# Patient Record
Sex: FEMALE | Race: WHITE | Employment: PART TIME | URBAN - METROPOLITAN AREA
[De-identification: names, ages, dates, MRNs, and addresses within clinical notes are randomized per-mention and may not be internally consistent; named-entity substitution may affect disease eponyms.]

---

## 2018-08-01 ENCOUNTER — OFFICE VISIT (OUTPATIENT)
Dept: VASCULAR SURGERY | Age: 58
End: 2018-08-01
Payer: COMMERCIAL

## 2018-08-01 VITALS — SYSTOLIC BLOOD PRESSURE: 112 MMHG | DIASTOLIC BLOOD PRESSURE: 82 MMHG | HEART RATE: 72 BPM

## 2018-08-01 DIAGNOSIS — I73.9 PERIPHERAL VASCULAR DISEASE (HCC): Chronic | ICD-10-CM

## 2018-08-01 PROCEDURE — 1036F TOBACCO NON-USER: CPT | Performed by: SURGERY

## 2018-08-01 PROCEDURE — 99213 OFFICE O/P EST LOW 20 MIN: CPT | Performed by: SURGERY

## 2018-08-01 PROCEDURE — G8427 DOCREV CUR MEDS BY ELIG CLIN: HCPCS | Performed by: SURGERY

## 2018-08-01 PROCEDURE — G8421 BMI NOT CALCULATED: HCPCS | Performed by: SURGERY

## 2018-08-01 PROCEDURE — 3017F COLORECTAL CA SCREEN DOC REV: CPT | Performed by: SURGERY

## 2018-08-01 RX ORDER — ERGOCALCIFEROL 1.25 MG/1
50000 CAPSULE ORAL WEEKLY
Refills: 0 | COMMUNITY
Start: 2018-06-16

## 2018-08-01 RX ORDER — CONJUGATED ESTROGENS 0.62 MG/G
CREAM VAGINAL
Refills: 0 | COMMUNITY
Start: 2018-07-13

## 2018-08-01 RX ORDER — DIPHENHYDRAMINE HCL 25 MG/1
TABLET, CHEWABLE ORAL
Refills: 0 | COMMUNITY
Start: 2018-05-03 | End: 2019-11-13

## 2018-08-20 ENCOUNTER — HOSPITAL ENCOUNTER (OUTPATIENT)
Dept: CARDIOLOGY | Age: 58
Discharge: HOME | End: 2018-08-20
Payer: COMMERCIAL

## 2018-08-20 PROCEDURE — 93970 EXTREMITY STUDY: CPT

## 2018-08-23 ENCOUNTER — TELEPHONE (OUTPATIENT)
Dept: VASCULAR SURGERY | Age: 58
End: 2018-08-23

## 2018-09-12 ENCOUNTER — OFFICE VISIT (OUTPATIENT)
Dept: VASCULAR SURGERY | Age: 58
End: 2018-09-12
Payer: COMMERCIAL

## 2018-09-12 VITALS — SYSTOLIC BLOOD PRESSURE: 118 MMHG | HEART RATE: 76 BPM | DIASTOLIC BLOOD PRESSURE: 76 MMHG

## 2018-09-12 DIAGNOSIS — I83.93 VARICOSE VEINS OF BOTH LOWER EXTREMITIES: Primary | ICD-10-CM

## 2018-09-12 PROCEDURE — 99213 OFFICE O/P EST LOW 20 MIN: CPT | Performed by: SURGERY

## 2018-09-12 PROCEDURE — G8427 DOCREV CUR MEDS BY ELIG CLIN: HCPCS | Performed by: SURGERY

## 2018-09-12 PROCEDURE — 1036F TOBACCO NON-USER: CPT | Performed by: SURGERY

## 2018-09-12 PROCEDURE — G8421 BMI NOT CALCULATED: HCPCS | Performed by: SURGERY

## 2018-09-12 PROCEDURE — 3017F COLORECTAL CA SCREEN DOC REV: CPT | Performed by: SURGERY

## 2018-09-12 RX ORDER — ASPIRIN 81 MG/1
81 TABLET ORAL DAILY
COMMUNITY
End: 2019-11-13

## 2018-09-12 NOTE — PROGRESS NOTES
facial asymmetry, speech difficulty, weakness, light-headedness, numbness and headaches. Hematological: Negative for adenopathy. Does not bruise/bleed easily. Psychiatric/Behavioral: Negative for agitation, confusion, sleep disturbance and suicidal ideas. The patient is not nervous/anxious. PHYSICAL EXAM:  Vitals:    09/12/18 0923   BP: 118/76   Pulse: 76     General Appearance: alert and oriented to person, place and time, well developed and well- nourished, in no acute distress  Skin: warm and dry, no rash or erythema  Head: normocephalic and atraumatic  Eyes: extraocular eye movements intact, conjunctivae normal  ENT: external ear and ear canal normal bilaterally, nose without deformity  Pulmonary/Chest: clear to auscultation bilaterally- no wheezes, rales or rhonchi, normal air movement, no respiratory distress  Cardiovascular: normal rate, regular rhythm, normal S1 and S2, no murmurs, no carotid bruits  Abdomen: soft, non-tender, non-distended, normal bowel sounds, no masses or organomegaly  Musculoskeletal: normal range of motion, no joint swelling, deformity or tenderness  Neurologic: no cranial nerve deficit, gait, coordination and speech normal  Extremities: no leg edema bilaterally, she has bilateral varicose veins are present. She has mostly spider veins in the corona radiata appearance of the bilateral thighs. She has some areas that are very superficial to the skin in these are the areas that have bled in the past.  She is easily bilateral palpable dorsalis pedis and posterior tibial pulses in bilateral brachial radial pulses all of which are 2-3+ and symmetric.         Chief Complaint   Patient presents with    Circulatory Problem     Discuss test results.          Problem List Items Addressed This Visit     None      Visit Diagnoses     Varicose veins of both lower extremities    -  Primary    Relevant Medications    aspirin EC 81 MG EC tablet          #1 Bilateral lower extremity

## 2018-09-25 ENCOUNTER — TELEPHONE (OUTPATIENT)
Dept: VASCULAR SURGERY | Age: 58
End: 2018-09-25

## 2018-09-25 NOTE — TELEPHONE ENCOUNTER
Left message that insurance company denied sclerotherapy, they stated that they found the service is not a covered benefit.

## 2018-10-18 ENCOUNTER — HOSPITAL ENCOUNTER (OUTPATIENT)
Age: 58
Discharge: HOME | End: 2018-10-20
Payer: COMMERCIAL

## 2018-10-18 PROCEDURE — 88175 CYTOPATH C/V AUTO FLUID REDO: CPT

## 2018-12-10 ENCOUNTER — HOSPITAL ENCOUNTER (OUTPATIENT)
Dept: HOSPITAL 83 - SDC | Age: 58
Discharge: HOME | End: 2018-12-10
Payer: COMMERCIAL

## 2018-12-10 VITALS — DIASTOLIC BLOOD PRESSURE: 83 MMHG

## 2018-12-10 VITALS — DIASTOLIC BLOOD PRESSURE: 84 MMHG | SYSTOLIC BLOOD PRESSURE: 132 MMHG

## 2018-12-10 VITALS — BODY MASS INDEX: 31.89 KG/M2 | HEIGHT: 62.99 IN | WEIGHT: 180 LBS

## 2018-12-10 VITALS — DIASTOLIC BLOOD PRESSURE: 79 MMHG | SYSTOLIC BLOOD PRESSURE: 127 MMHG

## 2018-12-10 VITALS — DIASTOLIC BLOOD PRESSURE: 81 MMHG

## 2018-12-10 DIAGNOSIS — K29.50: Primary | ICD-10-CM

## 2018-12-10 DIAGNOSIS — Z98.890: ICD-10-CM

## 2018-12-10 DIAGNOSIS — Z88.8: ICD-10-CM

## 2018-12-10 DIAGNOSIS — Z88.2: ICD-10-CM

## 2018-12-10 DIAGNOSIS — Z90.710: ICD-10-CM

## 2018-12-10 DIAGNOSIS — Z87.891: ICD-10-CM

## 2019-02-24 PROBLEM — I73.9 PERIPHERAL VASCULAR DISEASE (HCC): Chronic | Status: ACTIVE | Noted: 2019-02-24

## 2019-11-13 PROBLEM — R06.02 SOB (SHORTNESS OF BREATH): Status: ACTIVE | Noted: 2019-09-28

## 2019-11-13 PROBLEM — E78.00 PURE HYPERCHOLESTEROLEMIA: Status: ACTIVE | Noted: 2019-09-28

## 2019-11-13 PROBLEM — Z91.89 CARDIOVASCULAR RISK FACTOR: Status: ACTIVE | Noted: 2019-09-28

## 2019-11-13 PROBLEM — R73.03 PREDIABETES: Status: ACTIVE | Noted: 2019-09-28

## 2019-11-13 PROBLEM — Z78.9 STATIN INTOLERANCE: Status: ACTIVE | Noted: 2019-09-28

## 2019-11-13 PROBLEM — G47.33 OBSTRUCTIVE SLEEP APNEA: Status: ACTIVE | Noted: 2019-09-28

## 2019-11-13 PROBLEM — E04.1 NONTOXIC SINGLE THYROID NODULE: Status: ACTIVE | Noted: 2017-11-08

## 2020-01-23 ENCOUNTER — TELEPHONE (OUTPATIENT)
Dept: VASCULAR SURGERY | Age: 60
End: 2020-01-23

## 2020-02-28 ENCOUNTER — HOSPITAL ENCOUNTER (OUTPATIENT)
Dept: CARDIOLOGY | Age: 60
Discharge: HOME | End: 2020-02-28
Payer: COMMERCIAL

## 2020-02-28 PROCEDURE — 93978 VASCULAR STUDY: CPT

## 2020-03-05 ENCOUNTER — TELEPHONE (OUTPATIENT)
Dept: VASCULAR SURGERY | Age: 60
End: 2020-03-05

## 2020-09-15 ENCOUNTER — TELEPHONE (OUTPATIENT)
Dept: VASCULAR SURGERY | Age: 60
End: 2020-09-15

## 2020-10-20 ENCOUNTER — TELEPHONE (OUTPATIENT)
Dept: VASCULAR SURGERY | Age: 60
End: 2020-10-20

## 2020-10-21 ENCOUNTER — OFFICE VISIT (OUTPATIENT)
Dept: VASCULAR SURGERY | Age: 60
End: 2020-10-21
Payer: COMMERCIAL

## 2020-10-21 VITALS — HEIGHT: 63 IN | BODY MASS INDEX: 31.89 KG/M2 | WEIGHT: 180 LBS

## 2020-10-21 PROBLEM — M79.605 PAIN IN BOTH LOWER EXTREMITIES: Chronic | Status: ACTIVE | Noted: 2020-10-21

## 2020-10-21 PROBLEM — M79.604 PAIN IN BOTH LOWER EXTREMITIES: Chronic | Status: ACTIVE | Noted: 2020-10-21

## 2020-10-21 PROCEDURE — 1036F TOBACCO NON-USER: CPT | Performed by: SURGERY

## 2020-10-21 PROCEDURE — G8427 DOCREV CUR MEDS BY ELIG CLIN: HCPCS | Performed by: SURGERY

## 2020-10-21 PROCEDURE — G8417 CALC BMI ABV UP PARAM F/U: HCPCS | Performed by: SURGERY

## 2020-10-21 PROCEDURE — G8484 FLU IMMUNIZE NO ADMIN: HCPCS | Performed by: SURGERY

## 2020-10-21 PROCEDURE — 3017F COLORECTAL CA SCREEN DOC REV: CPT | Performed by: SURGERY

## 2020-10-21 PROCEDURE — 99213 OFFICE O/P EST LOW 20 MIN: CPT | Performed by: SURGERY

## 2020-10-21 NOTE — PROGRESS NOTES
Vascular Surgery Outpatient Progress Note      Chief Complaint   Patient presents with    Circulatory Problem     PVD       HISTORY OF PRESENT ILLNESS:                The patient is a 64 y.o. female who returns for follow-up evaluation of leg pain and discomfort. She describes her leg pain and discomfort associated with some varicose veins. She has classic corona radiata of the bilateral thighs. She describes some intermittent swelling of her ankles as well as her knees. She is recently had a right knee drained. She is concerned about blood clots. She denies any chest pain shortness of breath or discomfort. She denies any discoloration of her lower extremities. She denies any history of prior blood clots or bleeding disorders. She wants to undergo dry needling of her right leg followed by a custom knee brace. And would like our opinion    Past Medical History:        Diagnosis Date    Seasonal allergies     Sleep apnea     Vitamin D deficiency      Past Surgical History:        Procedure Laterality Date    HERNIA REPAIR Bilateral     inguinal    HYSTERECTOMY, VAGINAL      SINUS SURGERY      VAGINAL PROLAPSE REPAIR      [id number]     Current Medications:   Prior to Admission medications    Medication Sig Start Date End Date Taking?  Authorizing Provider   mometasone (ASMANEX, 30 METERED DOSES,) 110 MCG/INH AEPB Inhale 2 puffs into the lungs 2 times daily 5/29/20 6/28/20  Lopez Culp MD   albuterol sulfate  (90 Base) MCG/ACT inhaler INHALE 2 PUFFS BY MOUTH INTO THE LUNGS EVERY 6 HOURS AS NEEDED FOR WHEEZING 5/7/20   Lopez Culp MD   azelastine (ASTELIN) 0.1 % nasal spray instill 1 spray into each nostril twice a day  Patient not taking: Reported on 7/31/2020 4/6/20   Lopez Culp MD   pravastatin (PRAVACHOL) 10 MG tablet Take 10 mg by mouth daily 11/4/19   Historical Provider, MD   vitamin D (ERGOCALCIFEROL) 73191 units CAPS capsule Take 50,000 Units by mouth once a week 6/16/18 Historical Provider, MD   PREMARIN 0.625 MG/GM vaginal cream  18   Historical Provider, MD   sodium chloride (OCEAN, BABY AYR) 0.65 % nasal spray 1 spray by Nasal route as needed for Congestion    Historical Provider, MD     Allergies:  Erythromycin; Meperidine hcl; Sulfa antibiotics;  Adhesive tape; Demerol hcl [meperidine]; and Dermabond    Social History     Socioeconomic History    Marital status:      Spouse name: Not on file    Number of children: Not on file    Years of education: Not on file    Highest education level: Not on file   Occupational History    Not on file   Social Needs    Financial resource strain: Not on file    Food insecurity     Worry: Not on file     Inability: Not on file    Transportation needs     Medical: Not on file     Non-medical: Not on file   Tobacco Use    Smoking status: Former Smoker     Packs/day: 0.25     Years: 5.00     Pack years: 1.25     Types: Cigarettes     Start date:      Last attempt to quit: 1988     Years since quittin.2    Smokeless tobacco: Never Used   Substance and Sexual Activity    Alcohol use: Yes     Comment: once per month    Drug use: Never    Sexual activity: Not Currently     Partners: Male   Lifestyle    Physical activity     Days per week: Not on file     Minutes per session: Not on file    Stress: Not on file   Relationships    Social connections     Talks on phone: Not on file     Gets together: Not on file     Attends [employer] service: Not on file     Active member of club or organization: Not on file     Attends meetings of clubs or organizations: Not on file     Relationship status: Not on file    Intimate partner violence     Fear of current or ex partner: Not on file     Emotionally abused: Not on file     Physically abused: Not on file     Forced sexual activity: Not on file   Other Topics Concern    Not on file   Social History Narrative    Not on file        Family History   Problem Relation Age of Onset    Diabetes Mother     Sleep Apnea Mother     High Blood Pressure Mother     COPD Father     High Blood Pressure Father        REVIEW OF SYSTEMS:    Constitutional: Negative for activity change, appetite change, chills, diaphoresis, fatigue, fever and unexpected weight change. HEENT: Negative for congestion, ear discharge, ear pain, hearing loss, nosebleeds, rhinorrhea, sinus pain, sore throat, tinnitus, trouble swallowing and voice change. Eyes: Negative for photophobia, pain, discharge, redness, itching and visual disturbance. Respiratory: Negative for apnea, cough, chest tightness, shortness of breath and wheezing. Cardiovascular: Negative for chest pain, palpitations and leg swelling. Gastrointestinal: Negative for abdominal distention, abdominal pain, blood in stool, constipation, diarrhea, nausea and vomiting. Endocrine: Negative for cold intolerance, heat intolerance, polydipsia, polyphagia and polyuria. Genitourinary: Negative for decreased urine volume, difficulty urinating, dysuria, frequency, hematuria and urgency. Musculoskeletal: Knee pain and knee swelling negative for arthralgias, back pain, gait problem, joint swelling and neck pain. Skin: Negative for color change, pallor, rash and wound. Allergic/Immunologic: Negative for environmental allergies, food allergies and immunocompromised state. Neurological: Negative for dizziness, syncope, facial asymmetry, speech difficulty, weakness, light-headedness, numbness and headaches. Hematological: Negative for adenopathy. Does not bruise/bleed easily. Psychiatric/Behavioral: Negative for agitation, confusion, sleep disturbance and suicidal ideas. The patient is not nervous/anxious. Vascular see history of present illness      PHYSICAL EXAM:  There were no vitals filed for this visit.   General Appearance: alert and oriented to person, place and time, well developed and well- nourished, in no acute distress  Skin: warm and dry, no rash or erythema  Head: normocephalic and atraumatic  Eyes: extraocular eye movements intact, conjunctivae normal  ENT: external ear and ear canal normal bilaterally, nose without deformity  Pulmonary/Chest: clear to auscultation bilaterally- no wheezes, rales or rhonchi, normal air movement, no respiratory distress  Cardiovascular: normal rate, regular rhythm, normal S1 and S2, no murmurs, no carotid bruits  Abdomen: soft, non-tender, non-distended, normal bowel sounds, no masses or organomegaly  Musculoskeletal: normal range of motion, no joint swelling, deformity or tenderness  Neurologic: no cranial nerve deficit, gait, coordination and speech normal  Extremities: Bilateral palpable brachial and radial pulses metric at 3+. Bilateral palpable dorsalis pedis and posterior tibials are 3+. She has classic bilateral corona radiata spider veins of the bilateral thighs. There is no swelling. There is no edema. Problem List Items Addressed This Visit     Pain in both lower extremities (Chronic)          #1 lower extremity leg pain and discomfort combined with lower extremity swelling. She is concerned for DVTs. I reviewed her prior study. She has no evidence of DVT. She had no evidence of reflux disease we will reorder the ultrasound of her bilateral lower extremity secondary to her intermittent swelling that she has had. She can call for the results. As far as I am concerned she can continue with her knee therapy. She will call if there is any questions or concerns or any issues. #2 her father had a history of aneurysmal disease. She has no evidence of aneurysmal disease. I reviewed her studies. No follow-ups on file.

## 2020-10-30 ENCOUNTER — HOSPITAL ENCOUNTER (OUTPATIENT)
Dept: ULTRASOUND IMAGING | Age: 60
Discharge: HOME | End: 2020-11-01
Payer: COMMERCIAL

## 2020-10-30 PROCEDURE — 93970 EXTREMITY STUDY: CPT | Performed by: RADIOLOGY

## 2020-10-30 PROCEDURE — 93970 EXTREMITY STUDY: CPT

## 2020-11-02 ENCOUNTER — TELEPHONE (OUTPATIENT)
Dept: VASCULAR SURGERY | Age: 60
End: 2020-11-02

## 2020-11-02 NOTE — TELEPHONE ENCOUNTER
Notified patient that the venous ultrasound was normal, no evidence of deep vein thrombosis. Sent Dr. Anish Amado copy of results, per patient's request.  Sent appt card for 1 year follow up with Dr. Jhonny Isaac.

## 2021-07-02 ENCOUNTER — HOSPITAL ENCOUNTER (OUTPATIENT)
Dept: GENERAL RADIOLOGY | Age: 61
Discharge: HOME | End: 2021-07-04
Payer: COMMERCIAL

## 2021-07-02 ENCOUNTER — HOSPITAL ENCOUNTER (OUTPATIENT)
Age: 61
Discharge: HOME | End: 2021-07-04
Payer: COMMERCIAL

## 2021-07-02 DIAGNOSIS — R06.02 SOB (SHORTNESS OF BREATH): ICD-10-CM

## 2021-07-02 PROCEDURE — 71046 X-RAY EXAM CHEST 2 VIEWS: CPT

## 2021-09-23 ENCOUNTER — HOSPITAL ENCOUNTER (OUTPATIENT)
Dept: GENERAL RADIOLOGY | Age: 61
Discharge: HOME | End: 2021-09-25
Payer: COMMERCIAL

## 2021-09-23 ENCOUNTER — HOSPITAL ENCOUNTER (OUTPATIENT)
Age: 61
Discharge: HOME | End: 2021-09-25
Payer: COMMERCIAL

## 2021-09-23 DIAGNOSIS — R06.02 SOB (SHORTNESS OF BREATH): ICD-10-CM

## 2021-09-23 PROCEDURE — 71046 X-RAY EXAM CHEST 2 VIEWS: CPT

## 2021-11-02 ENCOUNTER — TELEPHONE (OUTPATIENT)
Dept: VASCULAR SURGERY | Age: 61
End: 2021-11-02

## 2021-11-02 NOTE — TELEPHONE ENCOUNTER
Called to confirm appointment for 11/3/21 at 1030 a.m, left message with date, time, and phone number for patient.

## 2021-11-03 ENCOUNTER — TELEPHONE (OUTPATIENT)
Dept: VASCULAR SURGERY | Age: 61
End: 2021-11-03

## 2021-11-03 ENCOUNTER — OFFICE VISIT (OUTPATIENT)
Dept: VASCULAR SURGERY | Age: 61
End: 2021-11-03
Payer: COMMERCIAL

## 2021-11-03 VITALS
DIASTOLIC BLOOD PRESSURE: 70 MMHG | HEIGHT: 63 IN | BODY MASS INDEX: 31.89 KG/M2 | SYSTOLIC BLOOD PRESSURE: 110 MMHG | WEIGHT: 180 LBS

## 2021-11-03 DIAGNOSIS — I83.811 VARICOSE VEINS OF RIGHT LOWER EXTREMITY WITH PAIN: ICD-10-CM

## 2021-11-03 DIAGNOSIS — Z13.6 ENCOUNTER FOR SCREENING FOR STENOSIS OF CAROTID ARTERY: ICD-10-CM

## 2021-11-03 DIAGNOSIS — M79.89 LEG SWELLING: Primary | ICD-10-CM

## 2021-11-03 PROCEDURE — 99214 OFFICE O/P EST MOD 30 MIN: CPT | Performed by: PHYSICIAN ASSISTANT

## 2021-11-03 PROCEDURE — G8417 CALC BMI ABV UP PARAM F/U: HCPCS | Performed by: PHYSICIAN ASSISTANT

## 2021-11-03 PROCEDURE — 3017F COLORECTAL CA SCREEN DOC REV: CPT | Performed by: PHYSICIAN ASSISTANT

## 2021-11-03 PROCEDURE — 1036F TOBACCO NON-USER: CPT | Performed by: PHYSICIAN ASSISTANT

## 2021-11-03 PROCEDURE — G8484 FLU IMMUNIZE NO ADMIN: HCPCS | Performed by: PHYSICIAN ASSISTANT

## 2021-11-03 PROCEDURE — G8427 DOCREV CUR MEDS BY ELIG CLIN: HCPCS | Performed by: PHYSICIAN ASSISTANT

## 2021-11-03 NOTE — PROGRESS NOTES
Vascular Surgery Outpatient Progress Note      Chief Complaint   Patient presents with    Check-Up     varicose veins       HISTORY OF PRESENT ILLNESS:                The patient is a 63 y.o. female who returns for follow-up evaluation of right leg pain and discomfort. She describes her leg pain and discomfort associated with some varicose veins to the medial right knee. She describes it to be achy and throbbing, worse at the end of the day. She has classic corona radiata of the bilateral thighs. She denies any leg swelling currently. She wears thigh high compression stockings intermittently. She has no history of dvt. She has no family history of dvt. She has chronic history of right knee pain. She has no pain of the LLE. Pt's father has history of AAA and carotid artery stenosis. Her aunt and uncle both has strokes associated with their carotid stenosis, per her report. She is a nonsmoker but her  smokes and she is exposed second hand. Past Medical History:        Diagnosis Date    Seasonal allergies     Sleep apnea     Vitamin D deficiency      Past Surgical History:        Procedure Laterality Date    HERNIA REPAIR Bilateral     inguinal    HYSTERECTOMY, VAGINAL      SINUS SURGERY      VAGINAL PROLAPSE REPAIR      [phone]     Current Medications:   Prior to Admission medications    Medication Sig Start Date End Date Taking?  Authorizing Provider   [facility]  MCG/ACT AERO inhaler inhale 2 puffs by mouth and INTO THE LUNGS twice a day 8/16/21  Yes Avery Garrido MD   lisinopril (PRINIVIL;ZESTRIL) 10 MG tablet Take 10 mg by mouth daily   Yes Historical Provider, MD   montelukast (SINGULAIR) 10 MG tablet Take 10 mg by mouth nightly 6/27/21  Yes Historical Provider, MD   albuterol sulfate  (90 Base) MCG/ACT inhaler INHALE 2 PUFFS BY MOUTH INTO THE LUNGS EVERY 6 HOURS AS NEEDED FOR WHEEZING 12/2/20  Yes Avery Garrido MD   olopatadine (PATANASE) 0.6 % SOLN nassl soln 0.6 Act by Social Connections:     Frequency of Communication with Friends and Family:     Frequency of Social Gatherings with Friends and Family:     Attends Sikhism Services:     Active Member of Clubs or Organizations:     Attends Club or Organization Meetings:     Marital Status:    Intimate Partner Violence:     Fear of Current or Ex-Partner:     Emotionally Abused:     Physically Abused:     Sexually Abused:         Family History   Problem Relation Age of Onset    Diabetes Mother     Sleep Apnea Mother     High Blood Pressure Mother     COPD Father     High Blood Pressure Father        PHYSICAL EXAM:  Vitals:    11/03/21 1037   BP: 110/70     General Appearance: alert and oriented to person, place and time, well developed and well- nourished, in no acute distress  Skin: warm and dry, no rash or erythema  Head: normocephalic and atraumatic  Eyes: extraocular eye movements intact, conjunctivae normal  ENT: external ear and ear canal normal bilaterally, nose without deformity  Pulmonary/Chest: clear to auscultation bilaterally- no wheezes, rales or rhonchi, normal air movement, no respiratory distress  Cardiovascular: normal rate, regular rhythm, normal S1 and S2, no murmurs, no carotid bruits  Abdomen: soft, non-tender, non-distended, normal bowel sounds, no masses or organomegaly  Musculoskeletal: normal range of motion, no joint swelling, deformity or tenderness  Neurologic: no cranial nerve deficit, gait, coordination and speech normal  Extremities: Bilateral palpable brachial and radial pulses metric at 3+. Bilateral palpable dorsalis pedis and posterior tibials are 2+. She has classic bilateral corona radiata spider veins of the bilateral thighs. She has a couple of varicosities in bilateral feet. There is no edema.     Problem List Items Addressed This Visit        Vascular Problems    Varicose veins of right lower extremity with pain    Relevant Orders    US LOWER EXTREMITY RIGHT VEIN MAPPING W DVT       Other    Leg swelling - Primary    Relevant Orders    US LOWER EXTREMITY RIGHT VEIN MAPPING W DVT      Other Visit Diagnoses     Encounter for screening for stenosis of carotid artery        Relevant Orders    US CAROTID ARTERY BILATERAL        #1 RLE varicosities with pain: I reviewed with the patient that the best therapy is to continue her thigh high compression stockings and elevate the extremities. I will obtain a reflux study as she has new symptoms of pain and edema. I discussed that surgical intervention will not improve the appearance of her telangiectasias and that it will only be performed with there is significant reflux and symptoms. She will call for results and follow up will be arranged at that time if warranted. #2 family history of carotid artery stenosis with tobacco exposure: will obtain screening carotid US. I discussed s/s of stroke and instruction should they occur. She will remain on risk reduction therapy. She will call for results of her US and we will arrange fu at that time.     Eron Munroe PA-C

## 2021-11-03 NOTE — TELEPHONE ENCOUNTER
Left message regarding testing at [address]. [facility] on Monday, 11-29-21 at 1:00 pm. [place] at 12:30 pm.

## 2021-11-29 ENCOUNTER — HOSPITAL ENCOUNTER (OUTPATIENT)
Dept: ULTRASOUND IMAGING | Age: 61
Discharge: HOME | End: 2021-12-01
Payer: COMMERCIAL

## 2021-11-29 DIAGNOSIS — I83.811 VARICOSE VEINS OF RIGHT LOWER EXTREMITY WITH PAIN: ICD-10-CM

## 2021-11-29 DIAGNOSIS — Z13.6 ENCOUNTER FOR SCREENING FOR STENOSIS OF CAROTID ARTERY: ICD-10-CM

## 2021-11-29 DIAGNOSIS — M79.89 LEG SWELLING: ICD-10-CM

## 2021-11-29 PROCEDURE — 93880 EXTRACRANIAL BILAT STUDY: CPT | Performed by: RADIOLOGY

## 2021-11-29 PROCEDURE — 93971 EXTREMITY STUDY: CPT | Performed by: RADIOLOGY

## 2021-11-29 PROCEDURE — 93971 EXTREMITY STUDY: CPT

## 2021-11-29 PROCEDURE — 93880 EXTRACRANIAL BILAT STUDY: CPT

## 2021-12-02 ENCOUNTER — TELEPHONE (OUTPATIENT)
Dept: VASCULAR SURGERY | Age: 61
End: 2021-12-02

## 2021-12-02 DIAGNOSIS — J45.20 MILD INTERMITTENT ASTHMA WITHOUT COMPLICATION: ICD-10-CM

## 2021-12-02 DIAGNOSIS — Z91.09 ENVIRONMENTAL ALLERGIES: ICD-10-CM

## 2021-12-02 DIAGNOSIS — R06.02 SOB (SHORTNESS OF BREATH): ICD-10-CM

## 2021-12-02 LAB — EOSINOPHILS ABSOLUTE COUNT: 110 /UL (ref 50–250)

## 2021-12-03 NOTE — TELEPHONE ENCOUNTER
Notified patient of test results. Scheduled follow up appointment to discuss options for varicose veins on 12-29-21.

## 2021-12-03 NOTE — TELEPHONE ENCOUNTER
Tell her the carotids are good.   No DVT she has some reflux and will we see her in the office after the holidays

## 2021-12-12 LAB
Lab: NORMAL
REPORT: NORMAL
THIS TEST SENT TO: NORMAL

## 2021-12-28 ENCOUNTER — TELEPHONE (OUTPATIENT)
Dept: VASCULAR SURGERY | Age: 61
End: 2021-12-28

## 2021-12-29 ENCOUNTER — OFFICE VISIT (OUTPATIENT)
Dept: VASCULAR SURGERY | Age: 61
End: 2021-12-29
Payer: COMMERCIAL

## 2021-12-29 VITALS
DIASTOLIC BLOOD PRESSURE: 80 MMHG | HEIGHT: 63 IN | WEIGHT: 177 LBS | SYSTOLIC BLOOD PRESSURE: 120 MMHG | BODY MASS INDEX: 31.36 KG/M2

## 2021-12-29 DIAGNOSIS — I78.1 SPIDER VEINS: Chronic | ICD-10-CM

## 2021-12-29 PROCEDURE — G8417 CALC BMI ABV UP PARAM F/U: HCPCS | Performed by: SURGERY

## 2021-12-29 PROCEDURE — 1036F TOBACCO NON-USER: CPT | Performed by: SURGERY

## 2021-12-29 PROCEDURE — G8482 FLU IMMUNIZE ORDER/ADMIN: HCPCS | Performed by: SURGERY

## 2021-12-29 PROCEDURE — 99213 OFFICE O/P EST LOW 20 MIN: CPT | Performed by: SURGERY

## 2021-12-29 PROCEDURE — G8427 DOCREV CUR MEDS BY ELIG CLIN: HCPCS | Performed by: SURGERY

## 2021-12-29 PROCEDURE — 3017F COLORECTAL CA SCREEN DOC REV: CPT | Performed by: SURGERY

## 2021-12-29 NOTE — PROGRESS NOTES
Vascular Surgery Outpatient Progress Note      Chief Complaint   Patient presents with    Follow-up     varicose veins       HISTORY OF PRESENT ILLNESS:                The patient is a 63 y.o. female who returns for follow-up evaluation of right leg pain and discomfort. She describes her leg pain and discomfort associated with some varicose veins to the medial right knee. She describes it to be achy and throbbing, worse at the end of the day. She has classic corona radiata of the bilateral thighs. She denies any leg swelling currently. She wears thigh high compression stockings intermittently. She has no history of dvt. She has no family history of dvt. She has chronic history of right knee pain. She has no pain of the LLE. She otherwise is doing well she still has some pain and discomfort of her left thigh. She is worried when she shaves she is going to cut the varicose veins. She has not had any issue yet. But it causes her some pain and discomfort on a scale 1-10 approximately 5 and achy in nature. Worse at the end of the day. She has compression stockings and has been intermittently compliant. Past Medical History:        Diagnosis Date    Seasonal allergies     Sleep apnea     Vitamin D deficiency      Past Surgical History:        Procedure Laterality Date    HERNIA REPAIR Bilateral     inguinal    HYSTERECTOMY, VAGINAL      SINUS SURGERY      VAGINAL PROLAPSE REPAIR      [id number]     Current Medications:   Prior to Admission medications    Medication Sig Start Date End Date Taking?  Authorizing Provider   albuterol sulfate  (90 Base) MCG/ACT inhaler inhale 2 puffs by mouth and INTO THE LUNGS every 6 hours if needed for wheezing 12/2/21  Yes Willian Cash,    Ascorbic Acid (VITAMIN C) 250 MG tablet Take 250 mg by mouth daily   Yes Historical Provider, MD   cycloSPORINE (RESTASIS) 0.05 % ophthalmic emulsion 1 drop 2 times daily   Yes Historical Provider, MD   [facility]  MCG/ACT AERO inhaler inhale 2 puffs by mouth and INTO THE LUNGS twice a day 21  Yes Belen Carrion MD   lisinopril (PRINIVIL;ZESTRIL) 10 MG tablet Take 10 mg by mouth daily   Yes Historical Provider, MD   montelukast (SINGULAIR) 10 MG tablet Take 10 mg by mouth nightly 21  Yes Historical Provider, MD   olopatadine (PATANASE) 0.6 % SOLN nassl soln 0.6 Act by Nasal route daily   Yes Historical Provider, MD   zinc 50 MG TABS tablet Take 50 mg by mouth daily   Yes Historical Provider, MD   pravastatin (PRAVACHOL) 10 MG tablet Take 10 mg by mouth daily 19  Yes Historical Provider, MD   vitamin D (ERGOCALCIFEROL) 32368 units CAPS capsule Take 50,000 Units by mouth once a week 18  Yes Historical Provider, MD   PREMARIN 0.625 MG/GM vaginal cream  18  Yes Historical Provider, MD   sodium chloride (OCEAN, BABY AYR) 0.65 % nasal spray 1 spray by Nasal route as needed for Congestion   Yes Historical Provider, MD     Allergies:  Erythromycin, Meperidine hcl, Sulfa antibiotics, Adhesive tape, Demerol hcl [meperidine], and Dermabond    Social History     Socioeconomic History    Marital status:      Spouse name: Not on file    Number of children: Not on file    Years of education: Not on file    Highest education level: Not on file   Occupational History    Occupation:    Tobacco Use    Smoking status: Former Smoker     Packs/day: 0.25     Years: 5.00     Pack years: 1.25     Types: Cigarettes     Start date:      Quit date: 1988     Years since quittin.4    Smokeless tobacco: Never Used   Vaping Use    Vaping Use: Never used    Passive vaping exposure: Yes   Substance and Sexual Activity    Alcohol use: Yes     Comment: once per month    Drug use: Never    Sexual activity: Not Currently     Partners: Male   Other Topics Concern    Not on file   Social History Narrative    Not on file     Social Determinants of Health     Financial Resource Strain:    [place] Difficulty of Paying Living Expenses: Not on file   Food Insecurity:     Worried About Running Out of Food in the Last Year: Not on file    Ran Out of Food in the Last Year: Not on file   Transportation Needs:     Lack of Transportation (Medical): Not on file    Lack of Transportation (Non-Medical):  Not on file   Physical Activity:     Days of Exercise per Week: Not on file    Minutes of Exercise per Session: Not on file   Stress:     Feeling of Stress : Not on file   Social Connections:     Frequency of Communication with Friends and Family: Not on file    Frequency of Social Gatherings with Friends and Family: Not on file    Attends Confucianist Services: Not on file    Active Member of [employer] Group or Organizations: Not on file    Attends Club or Organization Meetings: Not on file    Marital Status: Not on file   Intimate Partner Violence:     Fear of Current or Ex-Partner: Not on file    Emotionally Abused: Not on file    Physically Abused: Not on file    Sexually Abused: Not on file   Housing Stability:     Unable to Pay for Housing in the Last Year: Not on file    Number of [place] in the Last Year: Not on file    Unstable Housing in the Last Year: Not on file        Family History   Problem Relation Age of Onset    Diabetes Mother     Sleep Apnea Mother     High Blood Pressure Mother     COPD Father     High Blood Pressure Father        PHYSICAL EXAM:  Vitals:    12/29/21 0902   BP: 120/80     General Appearance: alert and oriented to person, place and time, well developed and well- nourished, in no acute distress  Skin: warm and dry, no rash or erythema  Head: normocephalic and atraumatic  Eyes: extraocular eye movements intact, conjunctivae normal  ENT: external ear and ear canal normal bilaterally, nose without deformity  Pulmonary/Chest: clear to auscultation bilaterally- no wheezes, rales or rhonchi, normal air movement, no respiratory distress  Cardiovascular: normal rate, regular rhythm, normal S1 and S2, no murmurs, no carotid bruits  Abdomen: soft, non-tender, non-distended, normal bowel sounds, no masses or organomegaly  Musculoskeletal: normal range of motion, no joint swelling, deformity or tenderness  Neurologic: no cranial nerve deficit, gait, coordination and speech normal  Extremities: Bilateral palpable brachial and radial pulses metric at 3+. Bilateral palpable dorsalis pedis and posterior tibials are 2+. She has classic bilateral corona radiata spider veins of the bilateral thighs. She has a couple of varicosities in bilateral feet. There is no edema. Bilateral varicose veins in the corona radiata pattern as stated above. Unchanged    Narrative   Patient MRN:  [id number]   : 1960   Age: 63 years   Gender: Female   Order Date:  2021 1:13 PM   EXAM: US CAROTID ARTERY BILATERAL   NUMBER OF IMAGES:  46   INDICATION: Z13.6 Encounter for screening for stenosis of carotid   artery    What reading provider will be dictating this exam?->[facility]   COMPARISON: None       FINDINGS:   Velocities are within normal limits   ICA\CCA ratios are  within normal limits   The right vertebral artery doppler images   demonstrate antegrade   flow. The left vertebral artery doppler images  demonstrate antegrade flow. Grey scale images demonstrate mild plaque identified in the right and   left carotid arteries.               Impression   Atherosclerotic disease. No hemodynamically significant stenosis is   identified   Estimated stenosis by NASCET criteria in the proximal right carotid   artery is between 0% and 49%. Estimated stenosis by NASCET criteria in the proximal left carotid   artery is between 0% and 49%.             Problem List Items Addressed This Visit     Spider veins (Chronic)        #1 RLE varicosities with pain:    At this point she has minimal reflux on the right side below the level of the knee.   She has no varicose veins associated with the segment of saphenous vein at that level. She has bilateral corona radiata spider veins. Left greater than right. At this point I recommend injection therapy combined with compression stockings. She can check with her insurance.   At that time she can be referred for sclerotherapy hopefully somewhere in town        Henry Lopez MD

## 2022-01-02 ENCOUNTER — TELEPHONE (OUTPATIENT)
Dept: PULMONOLOGY | Age: 62
End: 2022-01-02

## 2022-01-02 NOTE — TELEPHONE ENCOUNTER
Received a call thorough answering service from Mrs. Ban De Jesus.  informed me that she tested positive for COVID yesterday and had a productive cough with yellowish phlegm and chest tightness. She denied any fevers, no loss of small or taste and her oxygen saturations have been above 92%. She follows with my partner DR. Cash and has mild asthma currently on Asmanex and Singulair. She also uses her CPAP which she couldn't use last night due to her productive cough. Called in Z pack an dexamethasone to [facility] at [phone]. Advised her to continue to monitor her oxygen saturations and if drops to 90% or lower to come to the ER . Will also notify our office in a.m. to see if can e get her enrolled for monoclonal antibody given her history of Asthma. She can also continue to use her nasal spray and recommended sinus rinse. Patient in unvaccinated.     Jessica Serrano MD

## 2022-01-03 NOTE — TELEPHONE ENCOUNTER
Forms from hospital finally sent to office.  Forms for monoclonal antibody infusion  Completed and faxed to infusion center

## 2022-01-04 ENCOUNTER — HOSPITAL ENCOUNTER (OUTPATIENT)
Dept: INFUSION THERAPY | Age: 62
Setting detail: INFUSION SERIES
Discharge: HOME | End: 2022-01-04
Payer: COMMERCIAL

## 2022-01-04 VITALS
TEMPERATURE: 98.6 F | OXYGEN SATURATION: 96 % | HEART RATE: 65 BPM | DIASTOLIC BLOOD PRESSURE: 76 MMHG | WEIGHT: 177 LBS | HEIGHT: 63 IN | SYSTOLIC BLOOD PRESSURE: 122 MMHG | BODY MASS INDEX: 31.36 KG/M2 | RESPIRATION RATE: 16 BRPM

## 2022-01-04 PROCEDURE — 6360000002 HC RX W HCPCS: Performed by: INTERNAL MEDICINE

## 2022-01-04 PROCEDURE — M0243 CASIRIVI AND IMDEVI INFUSION: HCPCS

## 2022-01-04 PROCEDURE — 2580000003 HC RX 258: Performed by: INTERNAL MEDICINE

## 2022-01-04 RX ORDER — SODIUM CHLORIDE 9 MG/ML
25 INJECTION, SOLUTION INTRAVENOUS PRN
Status: CANCELLED | OUTPATIENT
Start: 2022-01-04

## 2022-01-04 RX ORDER — EPINEPHRINE 1 MG/ML
0.3 INJECTION, SOLUTION, CONCENTRATE INTRAVENOUS PRN
Status: CANCELLED | OUTPATIENT
Start: 2022-01-04

## 2022-01-04 RX ORDER — SODIUM CHLORIDE 0.9 % (FLUSH) 0.9 %
5-40 SYRINGE (ML) INJECTION EVERY 12 HOURS SCHEDULED
Status: CANCELLED | OUTPATIENT
Start: 2022-01-04

## 2022-01-04 RX ORDER — SODIUM CHLORIDE 9 MG/ML
100 INJECTION, SOLUTION INTRAVENOUS CONTINUOUS PRN
Status: DISCONTINUED | OUTPATIENT
Start: 2022-01-04 | End: 2022-01-05 | Stop reason: HOSPADM

## 2022-01-04 RX ORDER — SODIUM CHLORIDE 0.9 % (FLUSH) 0.9 %
5-40 SYRINGE (ML) INJECTION PRN
Status: DISCONTINUED | OUTPATIENT
Start: 2022-01-04 | End: 2022-01-05 | Stop reason: HOSPADM

## 2022-01-04 RX ORDER — ALBUTEROL SULFATE 90 UG/1
4 AEROSOL, METERED RESPIRATORY (INHALATION) PRN
Status: CANCELLED | OUTPATIENT
Start: 2022-01-04

## 2022-01-04 RX ORDER — ONDANSETRON 2 MG/ML
8 INJECTION INTRAMUSCULAR; INTRAVENOUS
Status: CANCELLED | OUTPATIENT
Start: 2022-01-04 | End: 2022-01-04

## 2022-01-04 RX ORDER — METHYLPREDNISOLONE SODIUM SUCCINATE 125 MG/2ML
125 INJECTION, POWDER, LYOPHILIZED, FOR SOLUTION INTRAMUSCULAR; INTRAVENOUS
Status: CANCELLED | OUTPATIENT
Start: 2022-01-04 | End: 2022-01-04

## 2022-01-04 RX ORDER — SODIUM CHLORIDE 9 MG/ML
INJECTION, SOLUTION INTRAVENOUS CONTINUOUS PRN
Status: CANCELLED | OUTPATIENT
Start: 2022-01-04 | End: 2022-01-04

## 2022-01-04 RX ORDER — DIPHENHYDRAMINE HYDROCHLORIDE 50 MG/ML
50 INJECTION INTRAMUSCULAR; INTRAVENOUS
Status: CANCELLED | OUTPATIENT
Start: 2022-01-04 | End: 2022-01-04

## 2022-01-04 RX ORDER — ACETAMINOPHEN 325 MG/1
650 TABLET ORAL
Status: CANCELLED | OUTPATIENT
Start: 2022-01-04 | End: 2022-01-04

## 2022-01-04 RX ADMIN — SODIUM CHLORIDE 100 ML/HR: 9 INJECTION, SOLUTION INTRAVENOUS at 17:58

## 2022-01-04 RX ADMIN — SODIUM CHLORIDE, PRESERVATIVE FREE 10 ML: 5 INJECTION INTRAVENOUS at 19:05

## 2022-01-04 RX ADMIN — Medication: at 17:58

## 2022-01-04 RX ADMIN — SODIUM CHLORIDE, PRESERVATIVE FREE 10 ML: 5 INJECTION INTRAVENOUS at 17:58

## 2022-01-06 ENCOUNTER — APPOINTMENT (OUTPATIENT)
Dept: GENERAL RADIOLOGY | Age: 62
End: 2022-01-06
Payer: COMMERCIAL

## 2022-01-06 ENCOUNTER — HOSPITAL ENCOUNTER (EMERGENCY)
Age: 62
Discharge: HOME | End: 2022-01-07
Attending: GENERAL PRACTICE
Payer: COMMERCIAL

## 2022-01-06 VITALS
DIASTOLIC BLOOD PRESSURE: 73 MMHG | BODY MASS INDEX: 31.35 KG/M2 | TEMPERATURE: 97.8 F | OXYGEN SATURATION: 97 % | WEIGHT: 177 LBS | SYSTOLIC BLOOD PRESSURE: 118 MMHG | RESPIRATION RATE: 12 BRPM | HEART RATE: 98 BPM

## 2022-01-06 DIAGNOSIS — U07.1 COVID-19: Primary | ICD-10-CM

## 2022-01-06 DIAGNOSIS — R06.02 SHORTNESS OF BREATH: ICD-10-CM

## 2022-01-06 LAB
ALBUMIN SERPL-MCNC: 4.2 G/DL (ref 3.5–5.2)
ALP BLD-CCNC: 59 U/L (ref 35–104)
ALT SERPL-CCNC: 25 U/L (ref 0–32)
ANION GAP SERPL CALCULATED.3IONS-SCNC: 12 MMOL/L (ref 7–16)
AST SERPL-CCNC: 14 U/L (ref 0–31)
BASOPHILS ABSOLUTE: 0.01 E9/L (ref 0–0.2)
BASOPHILS RELATIVE PERCENT: 0.1 % (ref 0–2)
BILIRUB SERPL-MCNC: 0.2 MG/DL (ref 0–1.2)
BUN BLDV-MCNC: 22 MG/DL (ref 6–20)
CALCIUM SERPL-MCNC: 9.9 MG/DL (ref 8.6–10.2)
CHLORIDE BLD-SCNC: 100 MMOL/L (ref 98–107)
CO2: 24 MMOL/L (ref 22–29)
CREAT SERPL-MCNC: 0.6 MG/DL (ref 0.5–1)
D DIMER: <200 NG/ML DDU
EOSINOPHILS ABSOLUTE: 0 E9/L (ref 0.05–0.5)
EOSINOPHILS RELATIVE PERCENT: 0 % (ref 0–6)
GFR AFRICAN AMERICAN: >60
GFR NON-AFRICAN AMERICAN: >60 ML/MIN/1.73
GLUCOSE BLD-MCNC: 138 MG/DL (ref 74–99)
HCT VFR BLD CALC: 45.3 % (ref 34–48)
HEMOGLOBIN: 15.4 G/DL (ref 11.5–15.5)
IMMATURE GRANULOCYTES #: 0.04 E9/L
IMMATURE GRANULOCYTES %: 0.4 % (ref 0–5)
LYMPHOCYTES ABSOLUTE: 0.84 E9/L (ref 1.5–4)
LYMPHOCYTES RELATIVE PERCENT: 7.5 % (ref 20–42)
MAGNESIUM: 2.2 MG/DL (ref 1.6–2.6)
MCH RBC QN AUTO: 31.2 PG (ref 26–35)
MCHC RBC AUTO-ENTMCNC: 34 % (ref 32–34.5)
MCV RBC AUTO: 91.7 FL (ref 80–99.9)
MONOCYTES ABSOLUTE: 0.49 E9/L (ref 0.1–0.95)
MONOCYTES RELATIVE PERCENT: 4.4 % (ref 2–12)
NEUTROPHILS ABSOLUTE: 9.85 E9/L (ref 1.8–7.3)
NEUTROPHILS RELATIVE PERCENT: 87.6 % (ref 43–80)
PDW BLD-RTO: 12.6 FL (ref 11.5–15)
PLATELET # BLD: 321 E9/L (ref 130–450)
PMV BLD AUTO: 9.1 FL (ref 7–12)
POTASSIUM SERPL-SCNC: 5.2 MMOL/L (ref 3.5–5)
PRO-BNP: 56 PG/ML (ref 0–125)
RBC # BLD: 4.94 E12/L (ref 3.5–5.5)
SODIUM BLD-SCNC: 136 MMOL/L (ref 132–146)
TOTAL PROTEIN: 7.1 G/DL (ref 6.4–8.3)
TROPONIN, HIGH SENSITIVITY: <6 NG/L (ref 0–9)
WBC # BLD: 11.2 E9/L (ref 4.5–11.5)

## 2022-01-06 PROCEDURE — 36415 COLL VENOUS BLD VENIPUNCTURE: CPT

## 2022-01-06 PROCEDURE — 93005 ELECTROCARDIOGRAM TRACING: CPT | Performed by: NURSE PRACTITIONER

## 2022-01-06 PROCEDURE — 99283 EMERGENCY DEPT VISIT LOW MDM: CPT

## 2022-01-06 PROCEDURE — 96360 HYDRATION IV INFUSION INIT: CPT

## 2022-01-06 PROCEDURE — 83880 ASSAY OF NATRIURETIC PEPTIDE: CPT

## 2022-01-06 PROCEDURE — 2580000003 HC RX 258: Performed by: PHYSICIAN ASSISTANT

## 2022-01-06 PROCEDURE — 83735 ASSAY OF MAGNESIUM: CPT

## 2022-01-06 PROCEDURE — 85378 FIBRIN DEGRADE SEMIQUANT: CPT

## 2022-01-06 PROCEDURE — 71046 X-RAY EXAM CHEST 2 VIEWS: CPT

## 2022-01-06 PROCEDURE — 84484 ASSAY OF TROPONIN QUANT: CPT

## 2022-01-06 PROCEDURE — 80053 COMPREHEN METABOLIC PANEL: CPT

## 2022-01-06 PROCEDURE — 6370000000 HC RX 637 (ALT 250 FOR IP): Performed by: PHYSICIAN ASSISTANT

## 2022-01-06 PROCEDURE — 85025 COMPLETE CBC W/AUTO DIFF WBC: CPT

## 2022-01-06 RX ORDER — IPRATROPIUM BROMIDE AND ALBUTEROL SULFATE 2.5; .5 MG/3ML; MG/3ML
1 SOLUTION RESPIRATORY (INHALATION)
Status: DISPENSED | OUTPATIENT
Start: 2022-01-06 | End: 2022-01-06

## 2022-01-06 RX ORDER — BENZONATATE 100 MG/1
100 CAPSULE ORAL 3 TIMES DAILY PRN
Qty: 21 CAPSULE | Refills: 0 | Status: SHIPPED | OUTPATIENT
Start: 2022-01-06 | End: 2022-01-13

## 2022-01-06 RX ORDER — 0.9 % SODIUM CHLORIDE 0.9 %
1000 INTRAVENOUS SOLUTION INTRAVENOUS ONCE
Status: COMPLETED | OUTPATIENT
Start: 2022-01-06 | End: 2022-01-06

## 2022-01-06 RX ADMIN — SODIUM CHLORIDE 1000 ML: 9 INJECTION, SOLUTION INTRAVENOUS at 22:38

## 2022-01-06 RX ADMIN — IPRATROPIUM BROMIDE AND ALBUTEROL SULFATE 1 AMPULE: .5; 2.5 SOLUTION RESPIRATORY (INHALATION) at 22:38

## 2022-01-07 LAB
EKG ATRIAL RATE: 77 BPM
EKG P AXIS: 51 DEGREES
EKG P-R INTERVAL: 148 MS
EKG Q-T INTERVAL: 382 MS
EKG QRS DURATION: 70 MS
EKG QTC CALCULATION (BAZETT): 432 MS
EKG R AXIS: -12 DEGREES
EKG T AXIS: 31 DEGREES
EKG VENTRICULAR RATE: 77 BPM

## 2022-01-07 NOTE — ED PROVIDER NOTES
ED Attending shared visit  CC: No       [address] [facility]  Department of Emergency Medicine   ED  Encounter Note  Admit Date/RoomTime: 2022  8:40 PM  ED Room:     NAME: Rollin Apgar  : 1960  MRN: [id number]     Chief Complaint:  Positive For Covid-19 (covid + on saturday, c/o increased wheezing, on steroid and z pack, increased cough, no chest pain, slight short of breath)    History of Present Illness      Rollin Apgar is a 63 y.o. old female who presents to the emergency department by private vehicle with gradual onset of sore throat, body aches, nonproductive cough, fever, chills, sore throat, and SOB at rest which began last Saturday(s) prior to arrival. She states she tested positive for COVID on . Her  is also sick. Her symptoms are associated with nothing additional, and denies HA, dizziness, lightheadedness, CP, hemoptysis, pain with inspiration, abdominal pain, NVD, calf pain/swelling. Since onset the symptoms have been remaining constant. The symptoms are aggravated by nothing and relieved by rescue inhaler. Patient notes that she is an asthmatic. She has not been routinely using her inhalers. She states her pulmonologist set her up with a nebulizer but she has not gotten it yet. She was sent in to the ER to get some breathing treatments. She notes that her PCP started her on steroids and a Z pack. Pt is not vaccinated against COVID. She denies recent travel, surgery, calf pain/swelling, hx of blood clots, or personal hx of CA. She notes a burning in her chest with the coughing but no chest pain or chest tightness. ROS   Pertinent positives and negatives are stated within HPI, all other systems reviewed and are negative. Past Medical History:  has a past medical history of Seasonal allergies, Sleep apnea, and Vitamin D deficiency. Surgical History:  has a past surgical history that includes sinus surgery;  Hernia repair (Bilateral); Vaginal prolapse repair; and Hysterectomy, vaginal.    Social History:  reports that she quit smoking about 33 years ago. Her smoking use included cigarettes. She started smoking about 39 years ago. She has a 1.25 pack-year smoking history. She has never used smokeless tobacco. She reports current alcohol use. She reports that she does not use drugs. Family History: family history includes COPD in her father; Diabetes in her mother; High Blood Pressure in her father and mother; Sleep Apnea in her mother. Allergies: Erythromycin, Meperidine hcl, Sulfa antibiotics, Adhesive tape, Demerol hcl [meperidine], and Dermabond    Physical Exam   Oxygen Saturation Interpretation: Normal on room air analysis. ED Triage Vitals   BP Temp Temp Source Pulse Resp SpO2 Height Weight   01/06/22 1705 01/06/22 1705 01/06/22 1705 01/06/22 1705 01/06/22 1705 01/06/22 1705 -- 01/06/22 1902   118/73 97.8 °F (36.6 °C) Oral 112 12 98 %  177 lb (80.3 kg)       Vitals:    01/06/22 1705 01/06/22 1902 01/06/22 2050   BP: 118/73     Pulse: 112  98   Resp: 12     Temp: 97.8 °F (36.6 °C)     TempSrc: Oral     SpO2: 98%  97%   Weight:  177 lb (80.3 kg)      · General Appearance/Constitutional:  Alert,. Head:  NC/NT. No maxillary or frontal tenderness to palpation  Ears: external ear canals without erythema or swelling; TMs with good light reflex and no bleeding, bulging, or retractions bilaterally; no mastoid tenderness  Eyes: EOMI bilaterally; PERRLA; conjunctiva clear bilaterally  Nose: no rhinorrhea  · Throat: posterior pharynx without erythema; tonsils without erythema, swelling, or exudate; Airway patent; no uvular deviation; patient handling secretions well  · Neck:  Normal ROM. Supple. No cervical lymphadenopathy; no meningeal symptoms  · Respiratoty:  Breath sounds: equal bilaterally.         Lung sounds: normal.  No wheezes, rhonchi, stridor; not in respiratory distress  · CV:  Regular rate and rhythm, normal heart sounds, without pathological murmurs, ectopy, gallops, or rubs. .  · GI:  Soft, nontender, good bowel sounds. No firm or pulsatile mass. · Integument:  Normal turgor. Warm, dry, without visible rash. · Extremities/Lymphatics:  Edema:  none Bilateral lower extremity(s). No evidence of DVT seen on physical exam.  · Neurological:  Oriented x3. Motor functions intact.     Lab / Imaging Results   (All laboratory and radiology results have been personally reviewed by myself)  Labs:  Results for orders placed or performed during the hospital encounter of 01/06/22   Comprehensive Metabolic Panel   Result Value Ref Range    Sodium 136 132 - 146 mmol/L    Potassium 5.2 (H) 3.5 - 5.0 mmol/L    Chloride 100 98 - 107 mmol/L    CO2 24 22 - 29 mmol/L    Anion Gap 12 7 - 16 mmol/L    Glucose 138 (H) 74 - 99 mg/dL    BUN 22 (H) 6 - 20 mg/dL    CREATININE 0.6 0.5 - 1.0 mg/dL    GFR Non-African American >60 >=60 mL/min/1.73    GFR African American >60     Calcium 9.9 8.6 - 10.2 mg/dL    Total Protein 7.1 6.4 - 8.3 g/dL    Albumin 4.2 3.5 - 5.2 g/dL    Total Bilirubin 0.2 0.0 - 1.2 mg/dL    Alkaline Phosphatase 59 35 - 104 U/L    ALT 25 0 - 32 U/L    AST 14 0 - 31 U/L   Magnesium   Result Value Ref Range    Magnesium 2.2 1.6 - 2.6 mg/dL   CBC Auto Differential   Result Value Ref Range    WBC 11.2 4.5 - 11.5 E9/L    RBC 4.94 3.50 - 5.50 E12/L    Hemoglobin 15.4 11.5 - 15.5 g/dL    Hematocrit 45.3 34.0 - 48.0 %    MCV 91.7 80.0 - 99.9 fL    MCH 31.2 26.0 - 35.0 pg    MCHC 34.0 32.0 - 34.5 %    RDW 12.6 11.5 - 15.0 fL    Platelets 674 335 - 960 E9/L    MPV 9.1 7.0 - 12.0 fL    Neutrophils % 87.6 (H) 43.0 - 80.0 %    Immature Granulocytes % 0.4 0.0 - 5.0 %    Lymphocytes % 7.5 (L) 20.0 - 42.0 %    Monocytes % 4.4 2.0 - 12.0 %    Eosinophils % 0.0 0.0 - 6.0 %    Basophils % 0.1 0.0 - 2.0 %    Neutrophils Absolute 9.85 (H) 1.80 - 7.30 E9/L    Immature Granulocytes # 0.04 E9/L    Lymphocytes Absolute 0.84 (L) 1.50 - 4.00 E9/L    Monocytes Absolute 0.49 0.10 - 0.95 E9/L    Eosinophils Absolute 0.00 (L) 0.05 - 0.50 E9/L    Basophils Absolute 0.01 0.00 - 0.20 E9/L   Troponin   Result Value Ref Range    Troponin, High Sensitivity <6 0 - 9 ng/L   Brain Natriuretic Peptide   Result Value Ref Range    Pro-BNP 56 0 - 125 pg/mL   D-Dimer, Quantitative   Result Value Ref Range    D-Dimer, Quant <200 ng/mL DDU   EKG 12 Lead   Result Value Ref Range    Ventricular Rate 77 BPM    Atrial Rate 77 BPM    P-R Interval 148 ms    QRS Duration 70 ms    Q-T Interval 382 ms    QTc Calculation (Bazett) 432 ms    P Axis 51 degrees    R Axis -12 degrees    T Axis 31 degrees     Imaging: All Radiology results interpreted by Radiologist unless otherwise noted. XR CHEST (2 VW)   Final Result   No acute process. EKG #1:  Interpreted by emergency department attending physician unless otherwise noted. 1/6/22  Time: 1925    Rhythm: normal sinus   Rate: normal  Axis: left  Conduction: normal  ST Segments: no acute change  T Waves: no acute change    Clinical Impression: no acute changes  Comparison to Prior tracings: There are no previous tracings available for comparison. ED Course / Medical Decision Making     Medications   ipratropium-albuterol (DUONEB) nebulizer solution 1 ampule (1 ampule Inhalation Given 1/6/22 2238)   0.9 % sodium chloride bolus (1,000 mLs IntraVENous New Bag 1/6/22 2238)     Consultations:             None    Procedures:   none    MDM:  Pt ambulated and pulse ox remained 95-96%. Patient presents as Covid positive. She is having some shortness of breath and worsening cough. Pt has asthma. Has burning in chest with cough. No wheezing. Not in respiratory distress. Was on steroid and Z pack per PCP. Labs and imaging ordered and reviewed above. Dimer within normal limits. EKG and Trop within normal limits. No acute process. She was given some fluids for the magnesium of 5.2. She was also given breathing treatments. Ambulated with normal pulse ox.  No hypoxia. No PNA on CXR. Labs/imaging WNL. She has inhalers at home and getting set up with nebulizer by her pulmonologist. Sending home with tessalon perles for cough. Educated pt on monitoring her pulse ox which she has at home. Sent info to infusion center for [facility] and told pt about this. Will have her follow-up with her PCP. All questions answered. Patient agreeable with the plan. Patient is in no acute distress, non-toxic, and appropriate for outpatient management. Pt educated on reasons to return to the ER, including need to return for new or worsening symptoms. Plan of Care/Counseling:  MARLEN CERVANTES PA-C and EM Attending Physician reviewed today's visit with the patient in addition to providing specific details for the plan of care and counseling regarding the diagnosis and prognosis. Questions are answered at this time and are agreeable with the plan. Assessment      1. COVID-19    2. Shortness of breath      This patient's ED course included: a personal history and physicial examination, re-evaluation prior to disposition and multiple bedside re-evaluations  This patient has improved during their ED course. Plan   Discharged home. Patient condition is good. New Medications     New Prescriptions    BENZONATATE (TESSALON PERLES) 100 MG CAPSULE    Take 1 capsule by mouth 3 times daily as needed for Cough     Electronically signed by MARLEN Jacobson PA-C   DD: 1/6/22  **This report was transcribed using voice recognition software. Every effort was made to ensure accuracy; however, inadvertent computerized transcription errors may be present.   END OF PROVIDER NOTE       Lola Holliday PA-C  01/07/22 [address], PA-C  01/07/22 [id number]

## 2022-01-07 NOTE — ED NOTES
Department of Emergency Medicine  FIRST PROVIDER TRIAGE NOTE             Independent MLP           1/6/22  7:07 PM EST    Date of Encounter: 1/6/22   MRN: [id number]      HPI: Tano Fischer is a 63 y.o. female who presents to the ED for Positive For Covid-19 (covid + on saturday, c/o increased wheezing, on steroid and z pack, increased cough, no chest pain, slight short of breath)      ROS: Negative for fever, vomiting or diarrhea. PE: Gen Appearance/Constitutional: alert  CV: tachycardia  Pulm: CTA bilat     Initial Plan of Care: All treatment areas with department are currently occupied. Plan to order/Initiate the following while awaiting opening in ED: labs, EKG and imaging studies.     Initial Plan of Care: Initiate Treatment-Testing, Proceed toTreatment Area When Bed Available for ED Attending/MLP to Continue Care    Electronically signed by WAYNE Fu CNP   DD: 1/6/22         WAYNE Fu CNP  01/06/22 [id number]

## 2022-01-07 NOTE — ED NOTES
States she feels much better after aerosol tx. Resp easy, 16/min and sa02 99%.      Avila Whitlock, ARIANNA  01/07/22 4899

## 2022-05-13 PROBLEM — Q45.3: Status: ACTIVE | Noted: 2022-05-13

## 2022-05-13 PROBLEM — R14.0 ABDOMINAL BLOATING: Status: ACTIVE | Noted: 2022-05-13

## 2022-05-13 PROBLEM — H04.123 DRY EYES: Status: ACTIVE | Noted: 2021-12-09

## 2022-10-25 ENCOUNTER — HOSPITAL ENCOUNTER (EMERGENCY)
Age: 62
Discharge: HOME | End: 2022-10-25
Attending: EMERGENCY MEDICINE
Payer: COMMERCIAL

## 2022-10-25 ENCOUNTER — APPOINTMENT (OUTPATIENT)
Dept: GENERAL RADIOLOGY | Age: 62
End: 2022-10-25
Payer: COMMERCIAL

## 2022-10-25 VITALS
HEIGHT: 63 IN | RESPIRATION RATE: 16 BRPM | OXYGEN SATURATION: 99 % | TEMPERATURE: 98.1 F | HEART RATE: 90 BPM | BODY MASS INDEX: 30.12 KG/M2 | SYSTOLIC BLOOD PRESSURE: 131 MMHG | DIASTOLIC BLOOD PRESSURE: 82 MMHG | WEIGHT: 170 LBS

## 2022-10-25 DIAGNOSIS — R06.02 SHORTNESS OF BREATH: Primary | ICD-10-CM

## 2022-10-25 LAB
ALBUMIN SERPL-MCNC: 4.8 G/DL (ref 3.5–5.2)
ALP BLD-CCNC: 54 U/L (ref 35–104)
ALT SERPL-CCNC: 17 U/L (ref 0–32)
ANION GAP SERPL CALCULATED.3IONS-SCNC: 12 MMOL/L (ref 7–16)
AST SERPL-CCNC: 15 U/L (ref 0–31)
BASOPHILS ABSOLUTE: 0.05 E9/L (ref 0–0.2)
BASOPHILS RELATIVE PERCENT: 0.6 % (ref 0–2)
BILIRUB SERPL-MCNC: 0.3 MG/DL (ref 0–1.2)
BUN BLDV-MCNC: 12 MG/DL (ref 6–23)
CALCIUM SERPL-MCNC: 10.3 MG/DL (ref 8.6–10.2)
CHLORIDE BLD-SCNC: 106 MMOL/L (ref 98–107)
CO2: 25 MMOL/L (ref 22–29)
CREAT SERPL-MCNC: 0.6 MG/DL (ref 0.5–1)
D DIMER: <200 NG/ML DDU
EOSINOPHILS ABSOLUTE: 0.08 E9/L (ref 0.05–0.5)
EOSINOPHILS RELATIVE PERCENT: 0.9 % (ref 0–6)
GFR SERPL CREATININE-BSD FRML MDRD: >60 ML/MIN/1.73
GLUCOSE BLD-MCNC: 109 MG/DL (ref 74–99)
HCT VFR BLD CALC: 43.7 % (ref 34–48)
HEMOGLOBIN: 15 G/DL (ref 11.5–15.5)
IMMATURE GRANULOCYTES #: 0.03 E9/L
IMMATURE GRANULOCYTES %: 0.3 % (ref 0–5)
LYMPHOCYTES ABSOLUTE: 1.37 E9/L (ref 1.5–4)
LYMPHOCYTES RELATIVE PERCENT: 15.4 % (ref 20–42)
MAGNESIUM: 2.1 MG/DL (ref 1.6–2.6)
MCH RBC QN AUTO: 32.4 PG (ref 26–35)
MCHC RBC AUTO-ENTMCNC: 34.3 % (ref 32–34.5)
MCV RBC AUTO: 94.4 FL (ref 80–99.9)
MONOCYTES ABSOLUTE: 0.66 E9/L (ref 0.1–0.95)
MONOCYTES RELATIVE PERCENT: 7.4 % (ref 2–12)
NEUTROPHILS ABSOLUTE: 6.71 E9/L (ref 1.8–7.3)
NEUTROPHILS RELATIVE PERCENT: 75.4 % (ref 43–80)
PDW BLD-RTO: 12.8 FL (ref 11.5–15)
PLATELET # BLD: 290 E9/L (ref 130–450)
PMV BLD AUTO: 9.4 FL (ref 7–12)
POTASSIUM REFLEX MAGNESIUM: 3.5 MMOL/L (ref 3.5–5)
RBC # BLD: 4.63 E12/L (ref 3.5–5.5)
SODIUM BLD-SCNC: 143 MMOL/L (ref 132–146)
TOTAL PROTEIN: 7.5 G/DL (ref 6.4–8.3)
TROPONIN, HIGH SENSITIVITY: 6 NG/L (ref 0–9)
TROPONIN, HIGH SENSITIVITY: 8 NG/L (ref 0–9)
WBC # BLD: 8.9 E9/L (ref 4.5–11.5)

## 2022-10-25 PROCEDURE — 85025 COMPLETE CBC W/AUTO DIFF WBC: CPT

## 2022-10-25 PROCEDURE — 85378 FIBRIN DEGRADE SEMIQUANT: CPT

## 2022-10-25 PROCEDURE — 84484 ASSAY OF TROPONIN QUANT: CPT

## 2022-10-25 PROCEDURE — 99285 EMERGENCY DEPT VISIT HI MDM: CPT

## 2022-10-25 PROCEDURE — 6370000000 HC RX 637 (ALT 250 FOR IP): Performed by: EMERGENCY MEDICINE

## 2022-10-25 PROCEDURE — 96361 HYDRATE IV INFUSION ADD-ON: CPT

## 2022-10-25 PROCEDURE — 2580000003 HC RX 258: Performed by: EMERGENCY MEDICINE

## 2022-10-25 PROCEDURE — 96374 THER/PROPH/DIAG INJ IV PUSH: CPT

## 2022-10-25 PROCEDURE — 93005 ELECTROCARDIOGRAM TRACING: CPT | Performed by: EMERGENCY MEDICINE

## 2022-10-25 PROCEDURE — 94664 DEMO&/EVAL PT USE INHALER: CPT

## 2022-10-25 PROCEDURE — 80053 COMPREHEN METABOLIC PANEL: CPT

## 2022-10-25 PROCEDURE — 36415 COLL VENOUS BLD VENIPUNCTURE: CPT

## 2022-10-25 PROCEDURE — 6360000002 HC RX W HCPCS: Performed by: EMERGENCY MEDICINE

## 2022-10-25 PROCEDURE — 71046 X-RAY EXAM CHEST 2 VIEWS: CPT

## 2022-10-25 PROCEDURE — 83735 ASSAY OF MAGNESIUM: CPT

## 2022-10-25 PROCEDURE — 96375 TX/PRO/DX INJ NEW DRUG ADDON: CPT

## 2022-10-25 RX ORDER — DIPHENHYDRAMINE HYDROCHLORIDE 50 MG/ML
25 INJECTION INTRAMUSCULAR; INTRAVENOUS ONCE
Status: COMPLETED | OUTPATIENT
Start: 2022-10-25 | End: 2022-10-25

## 2022-10-25 RX ORDER — METHYLPREDNISOLONE SODIUM SUCCINATE 125 MG/2ML
125 INJECTION, POWDER, LYOPHILIZED, FOR SOLUTION INTRAMUSCULAR; INTRAVENOUS ONCE
Status: COMPLETED | OUTPATIENT
Start: 2022-10-25 | End: 2022-10-25

## 2022-10-25 RX ORDER — 0.9 % SODIUM CHLORIDE 0.9 %
1000 INTRAVENOUS SOLUTION INTRAVENOUS ONCE
Status: COMPLETED | OUTPATIENT
Start: 2022-10-25 | End: 2022-10-25

## 2022-10-25 RX ORDER — IPRATROPIUM BROMIDE AND ALBUTEROL SULFATE 2.5; .5 MG/3ML; MG/3ML
1 SOLUTION RESPIRATORY (INHALATION) ONCE
Status: COMPLETED | OUTPATIENT
Start: 2022-10-25 | End: 2022-10-25

## 2022-10-25 RX ADMIN — METHYLPREDNISOLONE SODIUM SUCCINATE 125 MG: 125 INJECTION, POWDER, FOR SOLUTION INTRAMUSCULAR; INTRAVENOUS at 13:31

## 2022-10-25 RX ADMIN — DIPHENHYDRAMINE HYDROCHLORIDE 25 MG: 50 INJECTION INTRAMUSCULAR; INTRAVENOUS at 13:30

## 2022-10-25 RX ADMIN — SODIUM CHLORIDE 1000 ML: 9 INJECTION, SOLUTION INTRAVENOUS at 13:50

## 2022-10-25 RX ADMIN — IPRATROPIUM BROMIDE AND ALBUTEROL SULFATE 1 AMPULE: .5; 2.5 SOLUTION RESPIRATORY (INHALATION) at 13:25

## 2022-10-25 ASSESSMENT — PAIN - FUNCTIONAL ASSESSMENT
PAIN_FUNCTIONAL_ASSESSMENT: NONE - DENIES PAIN
PAIN_FUNCTIONAL_ASSESSMENT: NONE - DENIES PAIN

## 2022-10-25 ASSESSMENT — LIFESTYLE VARIABLES
HOW OFTEN DO YOU HAVE A DRINK CONTAINING ALCOHOL: NEVER
HOW MANY STANDARD DRINKS CONTAINING ALCOHOL DO YOU HAVE ON A TYPICAL DAY: PATIENT DOES NOT DRINK

## 2022-10-25 NOTE — ED PROVIDER NOTES
Department of Emergency Medicine   ED  Provider Note  Admit Date/RoomTime: 10/25/2022 11:54 AM  ED Room: Shanthi Valdes          History of Present Illness:  10/25/22, Time: 12:29 PM EDT  Chief Complaint   Patient presents with    Other     Allergy shot in office. SOB now. Gianna Bowen is a 63 y.o. female presenting to the ED for shortness of breath, beginning prior to arrival.  The complaint has been constant, moderate in severity, and worsened by nothing. Patient reports that she had received an allergy shot today for mold and other allergens, she reports that the dose is significantly increased from recent dosages. She reports that shortly after she had sudden onset of tightness in her chest and shortness of breath. .  No wheezing. No hives. No fevers or chills. No nausea or vomiting. No rash. She said she gave herself several puffs of her albuterol inhaler. She is not sure if this helped but says it made her shaky. She denies fever or chills. She does report that she is having a hard time taking a deep breath and feels like she is not moving as much air. She denies facial swelling. Denies any other complaints. Review of Systems:   A complete review of systems was performed and pertinent positives and negatives are stated within HPI, all other systems reviewed and are negative.        --------------------------------------------- PAST HISTORY ---------------------------------------------  Past Medical History:  has a past medical history of Seasonal allergies, Sleep apnea, and Vitamin D deficiency. Past Surgical History:  has a past surgical history that includes sinus surgery; Hernia repair (Bilateral); Vaginal prolapse repair; Hysterectomy, vaginal; and Cholecystectomy. Social History:  reports that she quit smoking about 34 years ago. Her smoking use included cigarettes. She started smoking about 39 years ago. She has a 1.25 pack-year smoking history.  She has never used smokeless tobacco. She reports current alcohol use. She reports that she does not use drugs. Family History: family history includes COPD in her father; Diabetes in her mother; High Blood Pressure in her father and mother; Sleep Apnea in her mother. . Unless otherwise noted, family history is non contributory    The patients home medications have been reviewed. Allergies: Erythromycin, Meperidine hcl, Sulfa antibiotics, Adhesive tape, Dermabond, and Meperidine    I have reviewed the past medical history, past surgical history, social history, and family history    ---------------------------------------------------PHYSICAL EXAM--------------------------------------    Constitutional/General: Alert and oriented x3  Head: Normocephalic and atraumatic  Eyes: PERRL, EOMI, sclera non icteric  ENT: Oropharynx clear, handling secretions, no trismus, no asymmetry of the posterior oropharynx or uvular edema. No angioedema. No tongue swelling. No facial swelling. Neck: Supple, full ROM, no stridor, no meningeal signs  Respiratory: Lungs clear to auscultation bilaterally, no wheezes, rales, or rhonchi. Not in respiratory distress  Cardiovascular: Tachycardic but regular rhythm. No murmurs, no gallops, no rubs. 2+ distal pulses. Equal extremity pulses. Musculoskeletal: Moves all extremities x 4. Warm and well perfused, no clubbing, no cyanosis, no edema. Capillary refill <3 seconds  Skin: skin warm and dry. No rashes. No hives  Neurologic: GCS 15, no focal deficits, symmetric strength 5/5 in the upper and lower extremities bilaterally  Psychiatric: Normal Affect          -------------------------------------------------- RESULTS -------------------------------------------------  Results are listed below.      LABS: (Lab results interpreted by me)  Results for orders placed or performed during the hospital encounter of 10/25/22   Troponin   Result Value Ref Range    Troponin, High Sensitivity 6 0 - 9 ng/L   D-Dimer, Quantitative   Result Value Ref Range    D-Dimer, Quant <200 ng/mL DDU   CBC with Auto Differential   Result Value Ref Range    WBC 8.9 4.5 - 11.5 E9/L    RBC 4.63 3.50 - 5.50 E12/L    Hemoglobin 15.0 11.5 - 15.5 g/dL    Hematocrit 43.7 34.0 - 48.0 %    MCV 94.4 80.0 - 99.9 fL    MCH 32.4 26.0 - 35.0 pg    MCHC 34.3 32.0 - 34.5 %    RDW 12.8 11.5 - 15.0 fL    Platelets 350 399 - 064 E9/L    MPV 9.4 7.0 - 12.0 fL    Neutrophils % 75.4 43.0 - 80.0 %    Immature Granulocytes % 0.3 0.0 - 5.0 %    Lymphocytes % 15.4 (L) 20.0 - 42.0 %    Monocytes % 7.4 2.0 - 12.0 %    Eosinophils % 0.9 0.0 - 6.0 %    Basophils % 0.6 0.0 - 2.0 %    Neutrophils Absolute 6.71 1.80 - 7.30 E9/L    Immature Granulocytes # 0.03 E9/L    Lymphocytes Absolute 1.37 (L) 1.50 - 4.00 E9/L    Monocytes Absolute 0.66 0.10 - 0.95 E9/L    Eosinophils Absolute 0.08 0.05 - 0.50 E9/L    Basophils Absolute 0.05 0.00 - 0.20 E9/L   Comprehensive Metabolic Panel w/ Reflex to MG   Result Value Ref Range    Sodium 143 132 - 146 mmol/L    Potassium reflex Magnesium 3.5 3.5 - 5.0 mmol/L    Chloride 106 98 - 107 mmol/L    CO2 25 22 - 29 mmol/L    Anion Gap 12 7 - 16 mmol/L    Glucose 109 (H) 74 - 99 mg/dL    BUN 12 6 - 23 mg/dL    Creatinine 0.6 0.5 - 1.0 mg/dL    Est, Glom Filt Rate >60 >=60 mL/min/1.73    Calcium 10.3 (H) 8.6 - 10.2 mg/dL    Total Protein 7.5 6.4 - 8.3 g/dL    Albumin 4.8 3.5 - 5.2 g/dL    Total Bilirubin 0.3 0.0 - 1.2 mg/dL    Alkaline Phosphatase 54 35 - 104 U/L    ALT 17 0 - 32 U/L    AST 15 0 - 31 U/L   Magnesium   Result Value Ref Range    Magnesium 2.1 1.6 - 2.6 mg/dL   Troponin   Result Value Ref Range    Troponin, High Sensitivity 8 0 - 9 ng/L   ,       RADIOLOGY:    Radiologist interpretation  XR CHEST (2 VW)   Final Result   No acute process.                EKG Interpretation  Interpreted by emergency department physician, Dr. Sanjeev Hawkins     Date of EKG: 10/25/22  Time: 1226    Rhythm: normal sinus   Rate: normal  Axis: normal  Conduction: Interpretation: 100 % on RA. Re-Evaluations:       improving      This patient's ED course included: a personal history and physicial examination, re-evaluation prior to disposition, IV medications, and complex medical decision making and emergency management    This patient has remained hemodynamically stable during their ED course. Counseling: The emergency provider has spoken with the patient and discussed todays results, in addition to providing specific details for the plan of care and counseling regarding the diagnosis and prognosis. Questions are answered at this time and they are agreeable with the plan.       --------------------------------- IMPRESSION AND DISPOSITION ---------------------------------    IMPRESSION  1. Shortness of breath        DISPOSITION  Disposition: Discharge to home if delta trop is negative  Patient condition is good        NOTE: This report was transcribed using voice recognition software.  Every effort was made to ensure accuracy; however, inadvertent computerized transcription errors may be present          Lb Alicea MD  10/25/22 0759

## 2022-10-26 LAB
EKG ATRIAL RATE: 87 BPM
EKG P AXIS: 58 DEGREES
EKG P-R INTERVAL: 164 MS
EKG Q-T INTERVAL: 370 MS
EKG QRS DURATION: 84 MS
EKG QTC CALCULATION (BAZETT): 445 MS
EKG R AXIS: 17 DEGREES
EKG T AXIS: 34 DEGREES
EKG VENTRICULAR RATE: 87 BPM

## 2022-10-26 PROCEDURE — 93010 ELECTROCARDIOGRAM REPORT: CPT | Performed by: INTERNAL MEDICINE

## 2023-03-14 ENCOUNTER — HOSPITAL ENCOUNTER (EMERGENCY)
Age: 63
Discharge: HOME | End: 2023-03-14
Attending: EMERGENCY MEDICINE
Payer: COMMERCIAL

## 2023-03-14 ENCOUNTER — APPOINTMENT (OUTPATIENT)
Dept: GENERAL RADIOLOGY | Age: 63
End: 2023-03-14
Payer: COMMERCIAL

## 2023-03-14 VITALS
DIASTOLIC BLOOD PRESSURE: 68 MMHG | WEIGHT: 170 LBS | HEIGHT: 63 IN | SYSTOLIC BLOOD PRESSURE: 107 MMHG | HEART RATE: 100 BPM | TEMPERATURE: 98.8 F | OXYGEN SATURATION: 95 % | BODY MASS INDEX: 30.12 KG/M2 | RESPIRATION RATE: 16 BRPM

## 2023-03-14 DIAGNOSIS — R06.02 SHORTNESS OF BREATH: Primary | ICD-10-CM

## 2023-03-14 LAB
ALBUMIN SERPL-MCNC: 4.3 G/DL (ref 3.5–5.2)
ALP BLD-CCNC: 50 U/L (ref 35–104)
ALT SERPL-CCNC: 27 U/L (ref 0–32)
ANION GAP SERPL CALCULATED.3IONS-SCNC: 10 MMOL/L (ref 7–16)
AST SERPL-CCNC: 16 U/L (ref 0–31)
BASOPHILS ABSOLUTE: 0.05 E9/L (ref 0–0.2)
BASOPHILS RELATIVE PERCENT: 0.7 % (ref 0–2)
BILIRUB SERPL-MCNC: 0.3 MG/DL (ref 0–1.2)
BUN BLDV-MCNC: 14 MG/DL (ref 6–23)
CALCIUM SERPL-MCNC: 9.6 MG/DL (ref 8.6–10.2)
CHLORIDE BLD-SCNC: 101 MMOL/L (ref 98–107)
CO2: 27 MMOL/L (ref 22–29)
CREAT SERPL-MCNC: 0.5 MG/DL (ref 0.5–1)
EKG ATRIAL RATE: 80 BPM
EKG P AXIS: 43 DEGREES
EKG P-R INTERVAL: 138 MS
EKG Q-T INTERVAL: 390 MS
EKG QRS DURATION: 78 MS
EKG QTC CALCULATION (BAZETT): 449 MS
EKG R AXIS: 21 DEGREES
EKG T AXIS: 33 DEGREES
EKG VENTRICULAR RATE: 80 BPM
EOSINOPHILS ABSOLUTE: 0.2 E9/L (ref 0.05–0.5)
EOSINOPHILS RELATIVE PERCENT: 2.9 % (ref 0–6)
GFR SERPL CREATININE-BSD FRML MDRD: >60 ML/MIN/1.73
GLUCOSE BLD-MCNC: 103 MG/DL (ref 74–99)
HCT VFR BLD CALC: 43 % (ref 34–48)
HEMOGLOBIN: 14.1 G/DL (ref 11.5–15.5)
IMMATURE GRANULOCYTES #: 0.01 E9/L
IMMATURE GRANULOCYTES %: 0.1 % (ref 0–5)
LYMPHOCYTES ABSOLUTE: 2.35 E9/L (ref 1.5–4)
LYMPHOCYTES RELATIVE PERCENT: 33.7 % (ref 20–42)
MCH RBC QN AUTO: 30.9 PG (ref 26–35)
MCHC RBC AUTO-ENTMCNC: 32.8 % (ref 32–34.5)
MCV RBC AUTO: 94.3 FL (ref 80–99.9)
MONOCYTES ABSOLUTE: 0.75 E9/L (ref 0.1–0.95)
MONOCYTES RELATIVE PERCENT: 10.7 % (ref 2–12)
NEUTROPHILS ABSOLUTE: 3.62 E9/L (ref 1.8–7.3)
NEUTROPHILS RELATIVE PERCENT: 51.9 % (ref 43–80)
PDW BLD-RTO: 12.8 FL (ref 11.5–15)
PLATELET # BLD: 299 E9/L (ref 130–450)
PMV BLD AUTO: 9.4 FL (ref 7–12)
POTASSIUM SERPL-SCNC: 3.8 MMOL/L (ref 3.5–5)
RBC # BLD: 4.56 E12/L (ref 3.5–5.5)
SODIUM BLD-SCNC: 138 MMOL/L (ref 132–146)
TOTAL PROTEIN: 6.9 G/DL (ref 6.4–8.3)
TROPONIN, HIGH SENSITIVITY: 7 NG/L (ref 0–9)
TROPONIN, HIGH SENSITIVITY: 8 NG/L (ref 0–9)
WBC # BLD: 7 E9/L (ref 4.5–11.5)

## 2023-03-14 PROCEDURE — 93005 ELECTROCARDIOGRAM TRACING: CPT | Performed by: PHYSICIAN ASSISTANT

## 2023-03-14 PROCEDURE — 71045 X-RAY EXAM CHEST 1 VIEW: CPT

## 2023-03-14 PROCEDURE — 6370000000 HC RX 637 (ALT 250 FOR IP): Performed by: PHYSICIAN ASSISTANT

## 2023-03-14 PROCEDURE — 80053 COMPREHEN METABOLIC PANEL: CPT

## 2023-03-14 PROCEDURE — 84484 ASSAY OF TROPONIN QUANT: CPT

## 2023-03-14 PROCEDURE — 6370000000 HC RX 637 (ALT 250 FOR IP): Performed by: STUDENT IN AN ORGANIZED HEALTH CARE EDUCATION/TRAINING PROGRAM

## 2023-03-14 PROCEDURE — 36415 COLL VENOUS BLD VENIPUNCTURE: CPT

## 2023-03-14 PROCEDURE — 99285 EMERGENCY DEPT VISIT HI MDM: CPT

## 2023-03-14 PROCEDURE — 93010 ELECTROCARDIOGRAM REPORT: CPT | Performed by: INTERNAL MEDICINE

## 2023-03-14 PROCEDURE — 85025 COMPLETE CBC W/AUTO DIFF WBC: CPT

## 2023-03-14 PROCEDURE — 6360000002 HC RX W HCPCS: Performed by: PHYSICIAN ASSISTANT

## 2023-03-14 PROCEDURE — 94664 DEMO&/EVAL PT USE INHALER: CPT

## 2023-03-14 RX ORDER — DEXAMETHASONE SODIUM PHOSPHATE 4 MG/ML
10 INJECTION, SOLUTION INTRA-ARTICULAR; INTRALESIONAL; INTRAMUSCULAR; INTRAVENOUS; SOFT TISSUE ONCE
Status: COMPLETED | OUTPATIENT
Start: 2023-03-14 | End: 2023-03-14

## 2023-03-14 RX ORDER — IPRATROPIUM BROMIDE AND ALBUTEROL SULFATE 2.5; .5 MG/3ML; MG/3ML
1 SOLUTION RESPIRATORY (INHALATION) ONCE
Status: COMPLETED | OUTPATIENT
Start: 2023-03-14 | End: 2023-03-14

## 2023-03-14 RX ORDER — DIPHENHYDRAMINE HCL 25 MG
25 TABLET ORAL ONCE
Status: COMPLETED | OUTPATIENT
Start: 2023-03-14 | End: 2023-03-14

## 2023-03-14 RX ORDER — PREDNISONE 20 MG/1
40 TABLET ORAL 2 TIMES DAILY
Qty: 12 TABLET | Refills: 0 | Status: SHIPPED | OUTPATIENT
Start: 2023-03-14 | End: 2023-03-17

## 2023-03-14 RX ADMIN — DEXAMETHASONE SODIUM PHOSPHATE 10 MG: 4 INJECTION, SOLUTION INTRAMUSCULAR; INTRAVENOUS at 11:43

## 2023-03-14 RX ADMIN — IPRATROPIUM BROMIDE AND ALBUTEROL SULFATE 1 AMPULE: .5; 3 SOLUTION RESPIRATORY (INHALATION) at 15:25

## 2023-03-14 RX ADMIN — DIPHENHYDRAMINE HYDROCHLORIDE 25 MG: 25 TABLET ORAL at 11:43

## 2023-03-14 ASSESSMENT — PAIN SCALES - GENERAL: PAINLEVEL_OUTOF10: 0

## 2023-03-14 ASSESSMENT — PAIN - FUNCTIONAL ASSESSMENT: PAIN_FUNCTIONAL_ASSESSMENT: 0-10

## 2023-03-14 NOTE — ED NOTES
Department of Emergency Medicine  FIRST PROVIDER TRIAGE NOTE             Independent MLP           3/14/23  11:38 AM EDT    Date of Encounter: 3/14/23   MRN: [id number]      HPI: Heidi Winkler is a 63 y.o. female who presents to the ED for Shortness of Breath (Had allergy shot one hour ago. Complaining of chest pain and SOB. +anxious. +\"feels like I'm going to pass out. \" Speaking in complete/full sentences. Airway patent with no swelling. )     Patient is a 12year-old presenting with anxiety as well as possible allergic reaction. Patient had an allergy shot and states this is happened before. Patient states she is not going to take the allergy shot again. Patient states she has a hard time \"catching her breath. \"    ROS: Negative for fever, cough, vomiting, or diarrhea. PE: Gen Appearance/Constitutional: alert and anxious in appearance  HEENT: NC/NT. PERRLA,  Airway patent. Airway is completely patent, no tongue swelling, uvula swelling, lip swelling  Neck: supple  CV: regular rate  Pulm: CTA bilat     Initial Plan of Care: All treatment areas with department are currently occupied. Plan to order/Initiate the following while awaiting opening in ED: labs and EKG.   Initiate Treatment-Testing, Proceed toTreatment Area When Bed Available for ED Attending/MLP to Continue Care    Electronically signed by Christel Mays PA-C   DD: 3/14/23       Christel Mays PA-C  03/14/23 [id number]

## 2023-03-14 NOTE — ED NOTES
Respirations even and unlabored. ED provider in to see patient.        Blanche Mclean RN  03/14/23 9224

## 2023-03-14 NOTE — ED PROVIDER NOTES
HPI  63 y.o. female presenting for shortness of breath. Patient had allergy shots today and shortly after, she developed chest tightness and shortness of breath. She felt lightheaded as well. No rashes. She states this is happened previously with allergy shots. She tried inhalers with no relief. She complains of still having some chest tightness. --------------------------------------------- PAST HISTORY ---------------------------------------------  Past Medical History:  has a past medical history of Seasonal allergies, Sleep apnea, and Vitamin D deficiency. Past Surgical History:  has a past surgical history that includes sinus surgery; Hernia repair (Bilateral); Vaginal prolapse repair; Hysterectomy, vaginal; and Cholecystectomy. Social History:  reports that she quit smoking about 34 years ago. Her smoking use included cigarettes. She started smoking about 40 years ago. She has a 1.25 pack-year smoking history. She has never used smokeless tobacco. She reports current alcohol use. She reports that she does not use drugs. Family History: family history includes COPD in her father; Diabetes in her mother; High Blood Pressure in her father and mother; Sleep Apnea in her mother. The patients home medications have been reviewed. Allergies: Erythromycin, Meperidine hcl, Sulfa antibiotics, Adhesive tape, Dermabond, and Meperidine      Review of Systems   Constitutional:  Negative for chills and fever. HENT:  Negative for congestion and sore throat. Eyes:  Negative for photophobia and visual disturbance. Respiratory:  Positive for chest tightness and shortness of breath. Negative for cough. Cardiovascular:  Negative for chest pain and leg swelling. Gastrointestinal:  Negative for abdominal pain, diarrhea and nausea. Genitourinary:  Negative for dysuria and flank pain. Musculoskeletal:  Negative for back pain and myalgias. Skin:  Negative for rash and wound.    Neurological: Positive for light-headedness. Negative for dizziness and headaches. Physical Exam  Constitutional:       General: She is not in acute distress. Appearance: Normal appearance. She is well-developed. She is not ill-appearing. HENT:      Head: Normocephalic and atraumatic. Right Ear: External ear normal.      Left Ear: External ear normal.      Nose: Nose normal.   Eyes:      Conjunctiva/sclera: Conjunctivae normal.   Cardiovascular:      Rate and Rhythm: Normal rate and regular rhythm. Pulmonary:      Effort: Pulmonary effort is normal. No respiratory distress. Breath sounds: Normal breath sounds. No stridor. No wheezing, rhonchi or rales. Comments: No wheezes appreciated  Abdominal:      General: There is no distension. Musculoskeletal:         General: No swelling or deformity. Skin:     General: Skin is warm and dry. Neurological:      General: No focal deficit present. Mental Status: She is alert. Psychiatric:         Mood and Affect: Mood normal.        Procedures   ED Course as of 03/15/23 1646   Tue Mar 14, 2023   1538 EKG: This EKG is signed and interpreted by me, Dr. Tolbert Kussmaul, MD    Rate: 80  Rhythm: Sinus  Interpretation: Normal sinus rhythm, normal OH interval, normal QRS, normal axis, normal QT interval, no acute ST or T wave changes  Comparison: stable as compared to patient's most recent EKG   [JA]   1624 I reviewed the patient's chart. Patient was seen by pulmonology, Dr. Raghu Tang, on 1/31/2023. She was evaluated for asthma and allergies [JA]   1624 Patient is a 63 female with history of asthma presenting with shortness of breath shortly after receiving her allergy shot. No tongue swelling, lip swelling, or difficulty swallowing. Also reports chest tightness. She states that this is happened in the past associated with her allergy shots. No history of CAD. No exertional symptoms.   No recent travel or immobilization, leg edema, calf tenderness, hemoptysis, syncope, hormone use, or history of DVT/PE. She is feeling better after breathing treatments. In the ED, she was hemodynamically stable and afebrile, saturating 100% on room air. Her pharynx was normal with no evidence of angioedema or swelling. Lungs were clear. No leg edema or calf tenderness. No abdominal tenderness.  [JA]      ED Course User Index  [JA] Krys Gaston MD       -------------------------------------------------- RESULTS -------------------------------------------------  Labs:  Results for orders placed or performed during the hospital encounter of 03/14/23   CBC with Auto Differential   Result Value Ref Range    WBC 7.0 4.5 - 11.5 E9/L    RBC 4.56 3.50 - 5.50 E12/L    Hemoglobin 14.1 11.5 - 15.5 g/dL    Hematocrit 43.0 34.0 - 48.0 %    MCV 94.3 80.0 - 99.9 fL    MCH 30.9 26.0 - 35.0 pg    MCHC 32.8 32.0 - 34.5 %    RDW 12.8 11.5 - 15.0 fL    Platelets 907 073 - 438 E9/L    MPV 9.4 7.0 - 12.0 fL    Neutrophils % 51.9 43.0 - 80.0 %    Immature Granulocytes % 0.1 0.0 - 5.0 %    Lymphocytes % 33.7 20.0 - 42.0 %    Monocytes % 10.7 2.0 - 12.0 %    Eosinophils % 2.9 0.0 - 6.0 %    Basophils % 0.7 0.0 - 2.0 %    Neutrophils Absolute 3.62 1.80 - 7.30 E9/L    Immature Granulocytes # 0.01 E9/L    Lymphocytes Absolute 2.35 1.50 - 4.00 E9/L    Monocytes Absolute 0.75 0.10 - 0.95 E9/L    Eosinophils Absolute 0.20 0.05 - 0.50 E9/L    Basophils Absolute 0.05 0.00 - 0.20 E9/L   Comprehensive Metabolic Panel   Result Value Ref Range    Sodium 138 132 - 146 mmol/L    Potassium 3.8 3.5 - 5.0 mmol/L    Chloride 101 98 - 107 mmol/L    CO2 27 22 - 29 mmol/L    Anion Gap 10 7 - 16 mmol/L    Glucose 103 (H) 74 - 99 mg/dL    BUN 14 6 - 23 mg/dL    Creatinine 0.5 0.5 - 1.0 mg/dL    Est, Glom Filt Rate >60 >=60 mL/min/1.73    Calcium 9.6 8.6 - 10.2 mg/dL    Total Protein 6.9 6.4 - 8.3 g/dL    Albumin 4.3 3.5 - 5.2 g/dL    Total Bilirubin 0.3 0.0 - 1.2 mg/dL    Alkaline Phosphatase 50 35 - 104 U/L ALT 27 0 - 32 U/L    AST 16 0 - 31 U/L   Troponin   Result Value Ref Range    Troponin, High Sensitivity 7 0 - 9 ng/L   Troponin   Result Value Ref Range    Troponin, High Sensitivity 8 0 - 9 ng/L   EKG 12 Lead   Result Value Ref Range    Ventricular Rate 80 BPM    Atrial Rate 80 BPM    P-R Interval 138 ms    QRS Duration 78 ms    Q-T Interval 390 ms    QTc Calculation (Bazett) 449 ms    P Axis 43 degrees    R Axis 21 degrees    T Axis 33 degrees       Radiology:  XR CHEST PORTABLE   Final Result   No acute process. ------------------------- NURSING NOTES AND VITALS REVIEWED ---------------------------  Date / Time Roomed:  3/14/2023  2:10 PM  ED Bed Assignment:  [address] [place] [address]    The nursing notes within the ED encounter and vital signs as below have been reviewed. /68   Pulse 100   Temp 98.8 °F (37.1 °C) (Oral)   Resp 16   Ht 5' 3\" (1.6 m)   Wt 170 lb (77.1 kg)   SpO2 95%   BMI 30.11 kg/m²   Oxygen Saturation Interpretation: Normal    Medical Decision Making  29-year-old female presenting for shortness of breath after receiving allergy shots. Vital signs stable. Lung exam unremarkable. Labs reassuring. No acute changes on EKG. Chest x-ray unremarkable. Patient given 10 mg Decadron p.o. and 25 mg Benadryl p.o. with some relief. She did complain of continued chest tightness. Therefore, DuoNeb was ordered. Patient had relief. Results were discussed with patient. She feels comfortable going home at this time. I do not feel admission is warranted at this time as vital signs are stable, chest tightness and shortness of breath improved after treatment, and patient feels comfortable going home. She was prescribed short course of prednisone and discharged home in stable condition with instructions to follow-up with PCP and to return to the ED if symptoms worsen.     Differential diagnoses included but were not limited to: ACS, allergic reaction, asthma exacerbation, PE, pneumonia, etc.    Amount and/or Complexity of Data Reviewed  Labs: ordered. Details: Troponins 7, 8  No leukocytosis  Electrolytes wnl  Radiology: ordered and independent interpretation performed. Details: CXR- no focal infiltrates or pneumothorax  ECG/medicine tests: ordered and independent interpretation performed. Details: rate 80; NSR; no acute ST elevations or depressions, no acute T wave changes, normal intervals; no significant change compared to most recent EKG    Risk  OTC drugs. Prescription drug management.        ------------------------------------------ PROGRESS NOTES ------------------------------------------  I have spoken with the patient and discussed todays results, in addition to providing specific details for the plan of care and counseling regarding the diagnosis and prognosis. Their questions are answered at this time and they are agreeable with the plan. I discussed at length with them reasons for immediate return here for re evaluation. They will followup with PCP.      --------------------------------- ADDITIONAL PROVIDER NOTES ---------------------------------  At this time the patient is without objective evidence of an acute process requiring hospitalization or inpatient management. They have remained hemodynamically stable throughout their entire ED visit and are stable for discharge with outpatient follow-up. The plan has been discussed in detail and they are aware of the specific conditions for emergent return, as well as the importance of follow-up. Discharge Medication List as of 3/14/2023  4:09 PM        START taking these medications    Details   predniSONE (DELTASONE) 20 MG tablet Take 2 tablets by mouth 2 times daily for 3 days, Disp-12 tablet, R-0Normal             Diagnosis:  1. Shortness of breath        Disposition:  Patient's disposition: Discharge to home  Patient's condition is stable.           Lisa Pearson MD  Resident  03/15/23 0924

## 2023-03-15 ASSESSMENT — ENCOUNTER SYMPTOMS
BACK PAIN: 0
SHORTNESS OF BREATH: 1
COUGH: 0
DIARRHEA: 0
NAUSEA: 0
SORE THROAT: 0
ABDOMINAL PAIN: 0
PHOTOPHOBIA: 0
CHEST TIGHTNESS: 1

## 2023-03-31 ENCOUNTER — HOSPITAL ENCOUNTER (OUTPATIENT)
Dept: GENERAL RADIOLOGY | Age: 63
End: 2023-03-31
Payer: COMMERCIAL

## 2023-03-31 ENCOUNTER — HOSPITAL ENCOUNTER (OUTPATIENT)
Age: 63
Discharge: HOME | End: 2023-03-31
Payer: COMMERCIAL

## 2023-03-31 DIAGNOSIS — R06.02 SHORTNESS OF BREATH: ICD-10-CM

## 2023-03-31 PROBLEM — E11.9 DIABETES MELLITUS WITHOUT COMPLICATION (HCC): Status: ACTIVE | Noted: 2023-02-14

## 2023-03-31 PROCEDURE — 71046 X-RAY EXAM CHEST 2 VIEWS: CPT

## 2023-04-06 LAB
IGA (MG/DL) IN SER/PLAS: 173 MG/DL (ref 70–400)
IGG (MG/DL) IN SER/PLAS: 831 MG/DL (ref 700–1600)
IGM (MG/DL) IN SER/PLAS: 65 MG/DL (ref 40–230)

## 2023-04-10 LAB
PNEUMO SEROTYPE INTERPRETATION: NORMAL
SEROTYPE 1, VIRC: 2.22 UG/ML
SEROTYPE 12F, VIRC: 1 UG/ML
SEROTYPE 14, VIRC: 11.2 UG/ML
SEROTYPE 18C(56), VIRC: 3.14 UG/ML
SEROTYPE 19F, VIRC: 0.98 UG/ML
SEROTYPE 23F, VIRC: 6.36 UG/ML
SEROTYPE 3, VIRC: 0.31 UG/ML
SEROTYPE 4, VIRC: 1.38 UG/ML
SEROTYPE 5, VIRC: 12.91 UG/ML
SEROTYPE 6B(26), VIRC: 4.53 UG/ML
SEROTYPE 7F(51), VIRC: 1.26 UG/ML
SEROTYPE 8, VIRC: 0.29 UG/ML
SEROTYPE 9N, VIRC: 0.12 UG/ML
SEROTYPE 9V(68), VIRC: 0.17 UG/ML

## 2023-05-30 ENCOUNTER — HOSPITAL ENCOUNTER (OUTPATIENT)
Age: 63
Discharge: HOME | End: 2023-06-01

## 2023-05-30 LAB
ABO + RH BLD: NORMAL
BLD GP AB SCN SERPL QL: NORMAL

## 2023-05-30 PROCEDURE — 86900 BLOOD TYPING SEROLOGIC ABO: CPT

## 2023-05-30 PROCEDURE — 86850 RBC ANTIBODY SCREEN: CPT

## 2023-05-30 PROCEDURE — 86901 BLOOD TYPING SEROLOGIC RH(D): CPT

## 2023-06-07 ENCOUNTER — HOSPITAL ENCOUNTER (OUTPATIENT)
Age: 63
Discharge: HOME | End: 2023-06-09

## 2023-06-07 LAB
ANION GAP SERPL CALCULATED.3IONS-SCNC: 11 MMOL/L (ref 7–16)
BUN SERPL-MCNC: 11 MG/DL (ref 6–23)
CALCIUM SERPL-MCNC: 8 MG/DL (ref 8.6–10.2)
CHLORIDE SERPL-SCNC: 108 MMOL/L (ref 98–107)
CO2 SERPL-SCNC: 22 MMOL/L (ref 22–29)
CREAT SERPL-MCNC: 0.6 MG/DL (ref 0.5–1)
ERYTHROCYTE [DISTWIDTH] IN BLOOD BY AUTOMATED COUNT: 13.1 FL (ref 11.5–15)
GLUCOSE SERPL-MCNC: 214 MG/DL (ref 74–99)
HCT VFR BLD AUTO: 36.8 % (ref 34–48)
HGB BLD-MCNC: 12.1 G/DL (ref 11.5–15.5)
MCH RBC QN AUTO: 31.3 PG (ref 26–35)
MCHC RBC AUTO-ENTMCNC: 32.9 % (ref 32–34.5)
MCV RBC AUTO: 95.3 FL (ref 80–99.9)
PLATELET # BLD AUTO: 282 E9/L (ref 130–450)
PMV BLD AUTO: 9.5 FL (ref 7–12)
POTASSIUM SERPL-SCNC: 3.8 MMOL/L (ref 3.5–5)
RBC # BLD AUTO: 3.86 E12/L (ref 3.5–5.5)
SODIUM SERPL-SCNC: 141 MMOL/L (ref 132–146)
WBC # BLD: 11.5 E9/L (ref 4.5–11.5)

## 2023-06-07 PROCEDURE — 80048 BASIC METABOLIC PNL TOTAL CA: CPT

## 2023-06-07 PROCEDURE — 85027 COMPLETE CBC AUTOMATED: CPT

## 2023-06-07 PROCEDURE — 87088 URINE BACTERIA CULTURE: CPT

## 2023-06-08 ENCOUNTER — HOSPITAL ENCOUNTER (OUTPATIENT)
Age: 63
Discharge: HOME | End: 2023-06-10

## 2023-06-08 LAB
ANION GAP SERPL CALCULATED.3IONS-SCNC: 10 MMOL/L (ref 7–16)
BUN SERPL-MCNC: 9 MG/DL (ref 6–23)
CALCIUM SERPL-MCNC: 8.2 MG/DL (ref 8.6–10.2)
CHLORIDE SERPL-SCNC: 109 MMOL/L (ref 98–107)
CO2 SERPL-SCNC: 23 MMOL/L (ref 22–29)
CREAT SERPL-MCNC: 0.6 MG/DL (ref 0.5–1)
ERYTHROCYTE [DISTWIDTH] IN BLOOD BY AUTOMATED COUNT: 13.3 FL (ref 11.5–15)
GLUCOSE SERPL-MCNC: 153 MG/DL (ref 74–99)
HCT VFR BLD AUTO: 34.4 % (ref 34–48)
HGB BLD-MCNC: 11.6 G/DL (ref 11.5–15.5)
MCH RBC QN AUTO: 31.9 PG (ref 26–35)
MCHC RBC AUTO-ENTMCNC: 33.7 % (ref 32–34.5)
MCV RBC AUTO: 94.5 FL (ref 80–99.9)
PLATELET # BLD AUTO: 251 E9/L (ref 130–450)
PMV BLD AUTO: 10 FL (ref 7–12)
POTASSIUM SERPL-SCNC: 3.5 MMOL/L (ref 3.5–5)
RBC # BLD AUTO: 3.64 E12/L (ref 3.5–5.5)
SODIUM SERPL-SCNC: 142 MMOL/L (ref 132–146)
WBC # BLD: 10.1 E9/L (ref 4.5–11.5)

## 2023-06-08 PROCEDURE — 85027 COMPLETE CBC AUTOMATED: CPT

## 2023-06-08 PROCEDURE — 80048 BASIC METABOLIC PNL TOTAL CA: CPT

## 2023-06-09 LAB — BACTERIA UR CULT: NORMAL

## 2023-09-25 PROBLEM — K58.9 IRRITABLE BOWEL SYNDROME: Status: ACTIVE | Noted: 2023-09-25

## 2023-09-25 PROBLEM — J45.909 ASTHMA: Status: ACTIVE | Noted: 2023-09-25

## 2023-09-25 PROBLEM — D64.9 ANEMIA: Status: ACTIVE | Noted: 2023-09-25

## 2024-04-29 ENCOUNTER — LAB (OUTPATIENT)
Dept: LAB | Facility: LAB | Age: 64
End: 2024-04-29
Payer: COMMERCIAL

## 2024-04-29 DIAGNOSIS — D80.0 HEREDITARY HYPOGAMMAGLOBULINEMIA (MULTI): ICD-10-CM

## 2024-04-29 DIAGNOSIS — Z23 ENCOUNTER FOR IMMUNIZATION: Primary | ICD-10-CM

## 2024-04-29 DIAGNOSIS — Z23 ENCOUNTER FOR IMMUNIZATION: ICD-10-CM

## 2024-04-29 PROCEDURE — 86317 IMMUNOASSAY INFECTIOUS AGENT: CPT

## 2024-04-29 PROCEDURE — 36415 COLL VENOUS BLD VENIPUNCTURE: CPT

## 2024-05-01 LAB
S PN DA SERO 19F IGG SER-MCNC: 35.67 UG/ML
S PNEUM DA 1 IGG SER-MCNC: 6.31 UG/ML
S PNEUM DA 10A IGG SER-MCNC: 4.61 UG/ML
S PNEUM DA 11A IGG SER-MCNC: 2.34 UG/ML
S PNEUM DA 12F IGG SER-MCNC: 0.25 UG/ML
S PNEUM DA 14 IGG SER-MCNC: 5.08 UG/ML
S PNEUM DA 15B IGG SER-MCNC: 0.12 UG/ML
S PNEUM DA 17F IGG SER-MCNC: 1.33 UG/ML
S PNEUM DA 18C IGG SER-MCNC: 2.24 UG/ML
S PNEUM DA 19A IGG SER-MCNC: 1.54 UG/ML
S PNEUM DA 2 IGG SER-MCNC: 1.21 UG/ML
S PNEUM DA 20A IGG SER-MCNC: 3.49 UG/ML
S PNEUM DA 22F IGG SER-MCNC: 0.21 UG/ML
S PNEUM DA 23F IGG SER-MCNC: 0.1 UG/ML
S PNEUM DA 3 IGG SER-MCNC: 0.91 UG/ML
S PNEUM DA 33F IGG SER-MCNC: 0.27 UG/ML
S PNEUM DA 4 IGG SER-MCNC: 2.85 UG/ML
S PNEUM DA 5 IGG SER-MCNC: >21.21 UG/ML
S PNEUM DA 6B IGG SER-MCNC: 1.82 UG/ML
S PNEUM DA 7F IGG SER-MCNC: 7.07 UG/ML
S PNEUM DA 8 IGG SER-MCNC: 0.36 UG/ML
S PNEUM DA 9N IGG SER-MCNC: 0.31 UG/ML
S PNEUM DA 9V IGG SER-MCNC: 1.44 UG/ML
S PNEUM SEROTYPE IGG SER-IMP: NORMAL

## 2024-06-24 ENCOUNTER — TELEPHONE (OUTPATIENT)
Dept: VASCULAR SURGERY | Age: 64
End: 2024-06-24

## 2024-06-24 NOTE — TELEPHONE ENCOUNTER
Received referral from Dr. Armendariz for Dr. Shetty  regarding varicose veins, left message for patient to return call,

## 2024-08-06 ENCOUNTER — OFFICE VISIT (OUTPATIENT)
Dept: VASCULAR SURGERY | Age: 64
End: 2024-08-06
Payer: COMMERCIAL

## 2024-08-06 VITALS — BODY MASS INDEX: 30.11 KG/M2 | WEIGHT: 170 LBS

## 2024-08-06 DIAGNOSIS — Z82.49 FAMILY HISTORY OF ABDOMINAL AORTIC ANEURYSM (AAA): Primary | ICD-10-CM

## 2024-08-06 DIAGNOSIS — I65.23 BILATERAL CAROTID ARTERY STENOSIS: ICD-10-CM

## 2024-08-06 DIAGNOSIS — I83.93 SPIDER VEINS OF BOTH LOWER EXTREMITIES: ICD-10-CM

## 2024-08-06 PROCEDURE — 99213 OFFICE O/P EST LOW 20 MIN: CPT

## 2024-08-06 PROCEDURE — G8417 CALC BMI ABV UP PARAM F/U: HCPCS

## 2024-08-06 PROCEDURE — G8427 DOCREV CUR MEDS BY ELIG CLIN: HCPCS

## 2024-08-06 PROCEDURE — 1036F TOBACCO NON-USER: CPT

## 2024-08-06 PROCEDURE — 3017F COLORECTAL CA SCREEN DOC REV: CPT

## 2024-08-06 NOTE — PROGRESS NOTES
vaping exposure: Yes   Substance and Sexual Activity    Alcohol use: Yes     Comment: once per month    Drug use: Never    Sexual activity: Not Currently     Partners: Male   Other Topics Concern    Not on file   Social History Narrative    Not on file     Social Determinants of Health     Financial Resource Strain: Not on file   Food Insecurity: Not on file   Transportation Needs: Not on file   Physical Activity: Not on file   Stress: Not on file   Social Connections: Not on file   Intimate Partner Violence: Not on file   Housing Stability: Not on file        Family History   Problem Relation Age of Onset    Diabetes Mother     Sleep Apnea Mother     High Blood Pressure Mother     COPD Father     High Blood Pressure Father        PHYSICAL EXAM:    CONSTITUTIONAL:  awake, alert, cooperative, no apparent distress, and appears stated age  EYES:  extra-ocular muscles intact and vision intact  ENT:  normocepalic, without obvious abnormality, atraumatic  NECK:  supple, symmetrical, trachea midline, no jugular venous distension   LUNGS:  no increased work of breathing, good air exchange and clear to auscultation  CARDIOVASCULAR:  regular rate and rhythm    ABDOMEN:  soft, non-distended, non-tender, Aorta not palpated  SKIN:  no lesions  EXTREMITIES: trace edema BLE          Right      Left   Brachial     Radial     Femoral     Popliteal     Dorsalis Pedis     Posterior Tibial 2 2   (3=normal, 2=diminished, 1=barely palpable, 4=widened)    Problem List Items Addressed This Visit          Circulatory    Bilateral carotid artery stenosis    Relevant Orders    Vascular duplex carotid bilateral    Spider veins of both lower extremities       Other    Family history of abdominal aortic aneurysm (AAA) - Primary    Relevant Orders    Vascular duplex abdominal aorta     Pt was seen 3 years ago by Dr. Ruiz for spider veins.  She still is having issues with these so I asked her to call Dr. Maloney for sclerotherapy

## 2024-08-23 ENCOUNTER — HOSPITAL ENCOUNTER (OUTPATIENT)
Dept: ULTRASOUND IMAGING | Age: 64
Discharge: HOME | End: 2024-08-23
Payer: COMMERCIAL

## 2024-08-23 ENCOUNTER — HOSPITAL ENCOUNTER (OUTPATIENT)
Dept: ULTRASOUND IMAGING | Age: 64
End: 2024-08-23
Payer: COMMERCIAL

## 2024-08-23 DIAGNOSIS — I65.23 BILATERAL CAROTID ARTERY STENOSIS: ICD-10-CM

## 2024-08-23 DIAGNOSIS — Z82.49 FAMILY HISTORY OF ABDOMINAL AORTIC ANEURYSM (AAA): ICD-10-CM

## 2024-08-23 PROCEDURE — 93880 EXTRACRANIAL BILAT STUDY: CPT

## 2024-08-23 PROCEDURE — 93976 VASCULAR STUDY: CPT

## 2024-08-27 ENCOUNTER — TELEPHONE (OUTPATIENT)
Dept: VASCULAR SURGERY | Age: 64
End: 2024-08-27

## 2024-09-03 ENCOUNTER — TELEPHONE (OUTPATIENT)
Dept: VASCULAR SURGERY | Age: 64
End: 2024-09-03

## 2024-09-03 NOTE — TELEPHONE ENCOUNTER
Called patient and reviewed results of carotid ultrasound showing no evidence of significant stenosis and ultrasound aorta that shows no evidence of aneurysmal disease.  Asked her to call as needed.

## 2025-04-23 ENCOUNTER — APPOINTMENT (OUTPATIENT)
Dept: CT IMAGING | Age: 65
End: 2025-04-23
Payer: COMMERCIAL

## 2025-04-23 ENCOUNTER — HOSPITAL ENCOUNTER (EMERGENCY)
Age: 65
Discharge: HOME | End: 2025-04-23
Attending: STUDENT IN AN ORGANIZED HEALTH CARE EDUCATION/TRAINING PROGRAM
Payer: COMMERCIAL

## 2025-04-23 VITALS
DIASTOLIC BLOOD PRESSURE: 74 MMHG | RESPIRATION RATE: 17 BRPM | SYSTOLIC BLOOD PRESSURE: 128 MMHG | TEMPERATURE: 98.3 F | HEART RATE: 89 BPM | OXYGEN SATURATION: 98 %

## 2025-04-23 DIAGNOSIS — K52.9 GASTROENTERITIS: Primary | ICD-10-CM

## 2025-04-23 DIAGNOSIS — E27.9 ADRENAL NODULE: ICD-10-CM

## 2025-04-23 LAB
ALBUMIN SERPL-MCNC: 4.2 G/DL (ref 3.5–5.2)
ALP SERPL-CCNC: 47 U/L (ref 35–104)
ALT SERPL-CCNC: 24 U/L (ref 0–32)
ANION GAP SERPL CALCULATED.3IONS-SCNC: 13 MMOL/L (ref 7–16)
AST SERPL-CCNC: 22 U/L (ref 0–31)
BASOPHILS # BLD: 0.04 K/UL (ref 0–0.2)
BASOPHILS NFR BLD: 1 % (ref 0–2)
BILIRUB SERPL-MCNC: 0.4 MG/DL (ref 0–1.2)
BUN SERPL-MCNC: 10 MG/DL (ref 6–23)
CALCIUM SERPL-MCNC: 9.1 MG/DL (ref 8.6–10.2)
CHLORIDE SERPL-SCNC: 104 MMOL/L (ref 98–107)
CO2 SERPL-SCNC: 22 MMOL/L (ref 22–29)
CREAT SERPL-MCNC: 0.6 MG/DL (ref 0.5–1)
EOSINOPHIL # BLD: 0.06 K/UL (ref 0.05–0.5)
EOSINOPHILS RELATIVE PERCENT: 1 % (ref 0–6)
ERYTHROCYTE [DISTWIDTH] IN BLOOD BY AUTOMATED COUNT: 13 % (ref 11.5–15)
GFR, ESTIMATED: >90 ML/MIN/1.73M2
GLUCOSE SERPL-MCNC: 125 MG/DL (ref 74–99)
HCT VFR BLD AUTO: 42.8 % (ref 34–48)
HGB BLD-MCNC: 14.9 G/DL (ref 11.5–15.5)
IMM GRANULOCYTES # BLD AUTO: <0.03 K/UL (ref 0–0.58)
IMM GRANULOCYTES NFR BLD: 0 % (ref 0–5)
LACTATE BLDV-SCNC: 0.8 MMOL/L (ref 0.5–2.2)
LIPASE SERPL-CCNC: 32 U/L (ref 13–60)
LYMPHOCYTES NFR BLD: 0.64 K/UL (ref 1.5–4)
LYMPHOCYTES RELATIVE PERCENT: 9 % (ref 20–42)
MAGNESIUM SERPL-MCNC: 2.1 MG/DL (ref 1.6–2.6)
MCH RBC QN AUTO: 31.8 PG (ref 26–35)
MCHC RBC AUTO-ENTMCNC: 34.8 G/DL (ref 32–34.5)
MCV RBC AUTO: 91.3 FL (ref 80–99.9)
MONOCYTES NFR BLD: 1.06 K/UL (ref 0.1–0.95)
MONOCYTES NFR BLD: 15 % (ref 2–12)
NEUTROPHILS NFR BLD: 74 % (ref 43–80)
NEUTS SEG NFR BLD: 5.22 K/UL (ref 1.8–7.3)
PLATELET # BLD AUTO: 305 K/UL (ref 130–450)
PMV BLD AUTO: 9.4 FL (ref 7–12)
POTASSIUM SERPL-SCNC: 3.4 MMOL/L (ref 3.5–5)
PROT SERPL-MCNC: 7.1 G/DL (ref 6.4–8.3)
RBC # BLD AUTO: 4.69 M/UL (ref 3.5–5.5)
SODIUM SERPL-SCNC: 139 MMOL/L (ref 132–146)
WBC OTHER # BLD: 7 K/UL (ref 4.5–11.5)

## 2025-04-23 PROCEDURE — 6360000002 HC RX W HCPCS: Performed by: STUDENT IN AN ORGANIZED HEALTH CARE EDUCATION/TRAINING PROGRAM

## 2025-04-23 PROCEDURE — 6370000000 HC RX 637 (ALT 250 FOR IP): Performed by: STUDENT IN AN ORGANIZED HEALTH CARE EDUCATION/TRAINING PROGRAM

## 2025-04-23 PROCEDURE — 96372 THER/PROPH/DIAG INJ SC/IM: CPT

## 2025-04-23 PROCEDURE — 83690 ASSAY OF LIPASE: CPT

## 2025-04-23 PROCEDURE — 83735 ASSAY OF MAGNESIUM: CPT

## 2025-04-23 PROCEDURE — 99285 EMERGENCY DEPT VISIT HI MDM: CPT

## 2025-04-23 PROCEDURE — 85025 COMPLETE CBC W/AUTO DIFF WBC: CPT

## 2025-04-23 PROCEDURE — 80053 COMPREHEN METABOLIC PANEL: CPT

## 2025-04-23 PROCEDURE — 83605 ASSAY OF LACTIC ACID: CPT

## 2025-04-23 PROCEDURE — 2580000003 HC RX 258: Performed by: STUDENT IN AN ORGANIZED HEALTH CARE EDUCATION/TRAINING PROGRAM

## 2025-04-23 PROCEDURE — 6360000004 HC RX CONTRAST MEDICATION: Performed by: RADIOLOGY

## 2025-04-23 PROCEDURE — 74177 CT ABD & PELVIS W/CONTRAST: CPT

## 2025-04-23 RX ORDER — PROCHLORPERAZINE MALEATE 10 MG
10 TABLET ORAL EVERY 8 HOURS PRN
Qty: 21 TABLET | Refills: 0 | Status: SHIPPED | OUTPATIENT
Start: 2025-04-23 | End: 2025-04-30

## 2025-04-23 RX ORDER — 0.9 % SODIUM CHLORIDE 0.9 %
1000 INTRAVENOUS SOLUTION INTRAVENOUS ONCE
Status: COMPLETED | OUTPATIENT
Start: 2025-04-23 | End: 2025-04-23

## 2025-04-23 RX ORDER — IOPAMIDOL 755 MG/ML
75 INJECTION, SOLUTION INTRAVASCULAR
Status: COMPLETED | OUTPATIENT
Start: 2025-04-23 | End: 2025-04-23

## 2025-04-23 RX ORDER — DICYCLOMINE HYDROCHLORIDE 10 MG/1
10 CAPSULE ORAL
Qty: 56 CAPSULE | Refills: 0 | Status: SHIPPED | OUTPATIENT
Start: 2025-04-23 | End: 2025-05-07

## 2025-04-23 RX ORDER — PROCHLORPERAZINE EDISYLATE 5 MG/ML
10 INJECTION INTRAMUSCULAR; INTRAVENOUS ONCE
Status: DISCONTINUED | OUTPATIENT
Start: 2025-04-23 | End: 2025-04-23 | Stop reason: HOSPADM

## 2025-04-23 RX ORDER — DICYCLOMINE HYDROCHLORIDE 10 MG/ML
20 INJECTION INTRAMUSCULAR ONCE
Status: COMPLETED | OUTPATIENT
Start: 2025-04-23 | End: 2025-04-23

## 2025-04-23 RX ADMIN — SODIUM CHLORIDE 1000 ML: 0.9 INJECTION, SOLUTION INTRAVENOUS at 12:49

## 2025-04-23 RX ADMIN — SODIUM CHLORIDE 1000 ML: 0.9 INJECTION, SOLUTION INTRAVENOUS at 11:24

## 2025-04-23 RX ADMIN — DICYCLOMINE HYDROCHLORIDE 20 MG: 10 INJECTION, SOLUTION INTRAMUSCULAR at 11:24

## 2025-04-23 RX ADMIN — IOPAMIDOL 75 ML: 755 INJECTION, SOLUTION INTRAVENOUS at 12:46

## 2025-04-23 RX ADMIN — POTASSIUM BICARBONATE 20 MEQ: 782 TABLET, EFFERVESCENT ORAL at 12:48

## 2025-04-23 ASSESSMENT — PAIN DESCRIPTION - PAIN TYPE: TYPE: ACUTE PAIN

## 2025-04-23 ASSESSMENT — PAIN DESCRIPTION - DESCRIPTORS: DESCRIPTORS: ACHING;CRAMPING;DISCOMFORT

## 2025-04-23 ASSESSMENT — PAIN SCALES - GENERAL
PAINLEVEL_OUTOF10: 10
PAINLEVEL_OUTOF10: 7

## 2025-04-23 ASSESSMENT — PAIN - FUNCTIONAL ASSESSMENT: PAIN_FUNCTIONAL_ASSESSMENT: 0-10

## 2025-04-23 ASSESSMENT — PAIN DESCRIPTION - LOCATION
LOCATION: ABDOMEN
LOCATION: ABDOMEN

## 2025-04-23 ASSESSMENT — PAIN DESCRIPTION - FREQUENCY: FREQUENCY: CONTINUOUS

## 2025-04-23 ASSESSMENT — PAIN DESCRIPTION - ONSET: ONSET: ON-GOING

## 2025-04-23 NOTE — DISCHARGE INSTRUCTIONS
Take all medication as prescribed  Follow-up with primary care doctor  If you notice any new worrisome symptoms please return to emergency department for evaluation  CT ABDOMEN PELVIS W IV CONTRAST Additional Contrast? None   Final Result   1. Mildly dilated loops of small bowel in the left abdomen may reflect ileus.   2. 1.8 cm left adrenal nodule.   3. Hepatic cysts.   4. Small fat containing left inguinal hernia.           Results for orders placed or performed during the hospital encounter of 04/23/25   CBC with Auto Differential   Result Value Ref Range    WBC 7.0 4.5 - 11.5 k/uL    RBC 4.69 3.50 - 5.50 m/uL    Hemoglobin 14.9 11.5 - 15.5 g/dL    Hematocrit 42.8 34.0 - 48.0 %    MCV 91.3 80.0 - 99.9 fL    MCH 31.8 26.0 - 35.0 pg    MCHC 34.8 (H) 32.0 - 34.5 g/dL    RDW 13.0 11.5 - 15.0 %    Platelets 305 130 - 450 k/uL    MPV 9.4 7.0 - 12.0 fL    Neutrophils % 74 43.0 - 80.0 %    Lymphocytes % 9 (L) 20.0 - 42.0 %    Monocytes % 15 (H) 2.0 - 12.0 %    Eosinophils % 1 0 - 6 %    Basophils % 1 0.0 - 2.0 %    Immature Granulocytes % 0 0.0 - 5.0 %    Neutrophils Absolute 5.22 1.80 - 7.30 k/uL    Lymphocytes Absolute 0.64 (L) 1.50 - 4.00 k/uL    Monocytes Absolute 1.06 (H) 0.10 - 0.95 k/uL    Eosinophils Absolute 0.06 0.05 - 0.50 k/uL    Basophils Absolute 0.04 0.00 - 0.20 k/uL    Immature Granulocytes Absolute <0.03 0.00 - 0.58 k/uL   Comprehensive Metabolic Panel w/ Reflex to MG   Result Value Ref Range    Sodium 139 132 - 146 mmol/L    Potassium 3.4 (L) 3.5 - 5.0 mmol/L    Chloride 104 98 - 107 mmol/L    CO2 22 22 - 29 mmol/L    Anion Gap 13 7 - 16 mmol/L    Glucose 125 (H) 74 - 99 mg/dL    BUN 10 6 - 23 mg/dL    Creatinine 0.6 0.50 - 1.00 mg/dL    Est, Glom Filt Rate >90 >60 mL/min/1.73m2    Calcium 9.1 8.6 - 10.2 mg/dL    Total Protein 7.1 6.4 - 8.3 g/dL    Albumin 4.2 3.5 - 5.2 g/dL    Total Bilirubin 0.4 0.0 - 1.2 mg/dL    Alkaline Phosphatase 47 35 - 104 U/L    ALT 24 0 - 32 U/L    AST 22 0 - 31 U/L   Lipase

## 2025-04-28 NOTE — ED PROVIDER NOTES
DISPOSITION/PLAN     DISPOSITION Decision To Discharge 04/23/2025 01:25:29 PM    PATIENT REFERRED TO:  Dima Armendariz III, MD  [address] [address]  [phone]    Schedule an appointment as soon as possible for a visit       [facility]  [address] [place] [address]  [phone]  Go to   If symptoms worsen      DISCHARGE MEDICATIONS:  Discharge Medication List as of 4/23/2025  2:20 PM        START taking these medications    Details   dicyclomine (BENTYL) 10 MG capsule Take 1 capsule by mouth 4 times daily (before meals and nightly) for 14 days, Disp-56 capsule, R-0Normal      prochlorperazine (COMPAZINE) 10 MG tablet Take 1 tablet by mouth every 8 hours as needed (nausea), Disp-21 tablet, R-0Normal             DISCONTINUED MEDICATIONS:  Discharge Medication List as of 4/23/2025  2:20 PM               (Please note that portions of this note were completed with a voice recognition program.  Efforts were made to edit the dictations but occasionally words are mis-transcribed.)    Chuck Nichole DO (electronically signed)       Chuck Nichole DO  04/28/25 [id number]

## 2025-06-22 ENCOUNTER — APPOINTMENT (OUTPATIENT)
Dept: GENERAL RADIOLOGY | Age: 65
End: 2025-06-22
Payer: COMMERCIAL

## 2025-06-22 ENCOUNTER — HOSPITAL ENCOUNTER (EMERGENCY)
Age: 65
Discharge: HOME | End: 2025-06-22
Attending: EMERGENCY MEDICINE
Payer: COMMERCIAL

## 2025-06-22 VITALS
BODY MASS INDEX: 29.44 KG/M2 | HEART RATE: 86 BPM | OXYGEN SATURATION: 97 % | TEMPERATURE: 98.2 F | SYSTOLIC BLOOD PRESSURE: 128 MMHG | WEIGHT: 160 LBS | HEIGHT: 62 IN | DIASTOLIC BLOOD PRESSURE: 72 MMHG | RESPIRATION RATE: 14 BRPM

## 2025-06-22 DIAGNOSIS — R07.9 CHEST PAIN, UNSPECIFIED TYPE: Primary | ICD-10-CM

## 2025-06-22 LAB
ALBUMIN SERPL-MCNC: 4.5 G/DL (ref 3.5–5.2)
ALP SERPL-CCNC: 55 U/L (ref 35–104)
ALT SERPL-CCNC: 24 U/L (ref 0–35)
AMYLASE SERPL-CCNC: 75 U/L (ref 28–100)
ANION GAP SERPL CALCULATED.3IONS-SCNC: 13 MMOL/L (ref 7–16)
AST SERPL-CCNC: 17 U/L (ref 0–35)
BILIRUB DIRECT SERPL-MCNC: 0.2 MG/DL (ref 0–0.2)
BILIRUB INDIRECT SERPL-MCNC: 0.2 MG/DL (ref 0–1)
BILIRUB SERPL-MCNC: 0.3 MG/DL (ref 0–1.2)
BNP SERPL-MCNC: 47 PG/ML (ref 0–125)
BUN SERPL-MCNC: 17 MG/DL (ref 8–23)
CALCIUM SERPL-MCNC: 9.7 MG/DL (ref 8.8–10.2)
CHLORIDE SERPL-SCNC: 104 MMOL/L (ref 98–107)
CK SERPL-CCNC: 42 U/L (ref 0–170)
CO2 SERPL-SCNC: 26 MMOL/L (ref 22–29)
CREAT SERPL-MCNC: 1 MG/DL (ref 0.5–1)
D-DIMER QUANTITATIVE: 281 NG/ML DDU (ref 0–230)
ERYTHROCYTE [DISTWIDTH] IN BLOOD BY AUTOMATED COUNT: 13.4 % (ref 11.5–15)
GFR, ESTIMATED: 67 ML/MIN/1.73M2
GLUCOSE SERPL-MCNC: 123 MG/DL (ref 74–99)
HCT VFR BLD AUTO: 42 % (ref 34–48)
HGB BLD-MCNC: 14.5 G/DL (ref 11.5–15.5)
INR PPP: 1
LIPASE SERPL-CCNC: 64 U/L (ref 13–60)
MAGNESIUM SERPL-MCNC: 2.2 MG/DL (ref 1.6–2.4)
MCH RBC QN AUTO: 31.3 PG (ref 26–35)
MCHC RBC AUTO-ENTMCNC: 34.5 G/DL (ref 32–34.5)
MCV RBC AUTO: 90.7 FL (ref 80–99.9)
PLATELET # BLD AUTO: 349 K/UL (ref 130–450)
PMV BLD AUTO: 8.7 FL (ref 7–12)
POTASSIUM SERPL-SCNC: 3.9 MMOL/L (ref 3.5–5.1)
PROT SERPL-MCNC: 7 G/DL (ref 6.4–8.3)
PROTHROMBIN TIME: 10.9 SEC (ref 9.3–12.4)
RBC # BLD AUTO: 4.63 M/UL (ref 3.5–5.5)
SODIUM SERPL-SCNC: 142 MMOL/L (ref 136–145)
TROPONIN I SERPL HS-MCNC: <6 NG/L (ref 0–14)
WBC OTHER # BLD: 9.9 K/UL (ref 4.5–11.5)

## 2025-06-22 PROCEDURE — 93005 ELECTROCARDIOGRAM TRACING: CPT | Performed by: EMERGENCY MEDICINE

## 2025-06-22 PROCEDURE — 82150 ASSAY OF AMYLASE: CPT

## 2025-06-22 PROCEDURE — 83880 ASSAY OF NATRIURETIC PEPTIDE: CPT

## 2025-06-22 PROCEDURE — 99285 EMERGENCY DEPT VISIT HI MDM: CPT

## 2025-06-22 PROCEDURE — 83735 ASSAY OF MAGNESIUM: CPT

## 2025-06-22 PROCEDURE — 71045 X-RAY EXAM CHEST 1 VIEW: CPT

## 2025-06-22 PROCEDURE — 85379 FIBRIN DEGRADATION QUANT: CPT

## 2025-06-22 PROCEDURE — 6370000000 HC RX 637 (ALT 250 FOR IP): Performed by: EMERGENCY MEDICINE

## 2025-06-22 PROCEDURE — 84484 ASSAY OF TROPONIN QUANT: CPT

## 2025-06-22 PROCEDURE — 85610 PROTHROMBIN TIME: CPT

## 2025-06-22 PROCEDURE — 82550 ASSAY OF CK (CPK): CPT

## 2025-06-22 PROCEDURE — 80053 COMPREHEN METABOLIC PANEL: CPT

## 2025-06-22 PROCEDURE — 82248 BILIRUBIN DIRECT: CPT

## 2025-06-22 PROCEDURE — 83690 ASSAY OF LIPASE: CPT

## 2025-06-22 PROCEDURE — 85027 COMPLETE CBC AUTOMATED: CPT

## 2025-06-22 RX ORDER — ASPIRIN 81 MG/1
324 TABLET, CHEWABLE ORAL ONCE
Status: COMPLETED | OUTPATIENT
Start: 2025-06-22 | End: 2025-06-22

## 2025-06-22 RX ADMIN — ASPIRIN 324 MG: 81 TABLET, CHEWABLE ORAL at 22:57

## 2025-06-22 ASSESSMENT — LIFESTYLE VARIABLES
HOW MANY STANDARD DRINKS CONTAINING ALCOHOL DO YOU HAVE ON A TYPICAL DAY: PATIENT DOES NOT DRINK
HOW OFTEN DO YOU HAVE A DRINK CONTAINING ALCOHOL: NEVER

## 2025-06-23 DIAGNOSIS — B99.9 RECURRENT INFECTIONS: Primary | ICD-10-CM

## 2025-06-23 LAB
EKG ATRIAL RATE: 82 BPM
EKG P AXIS: 29 DEGREES
EKG P-R INTERVAL: 150 MS
EKG Q-T INTERVAL: 376 MS
EKG QRS DURATION: 76 MS
EKG QTC CALCULATION (BAZETT): 439 MS
EKG R AXIS: 3 DEGREES
EKG T AXIS: 13 DEGREES
EKG VENTRICULAR RATE: 82 BPM

## 2025-06-23 PROCEDURE — 93010 ELECTROCARDIOGRAM REPORT: CPT | Performed by: INTERNAL MEDICINE

## 2025-06-23 NOTE — ED PROVIDER NOTES
HPI:  6/22/25, Time: 10:47 PM EDT         Nydia Dickens is a 64 y.o. female presenting to the ED for atypical chest pain sharp only lasting for few seconds at a time not related to exertion she had no shortness of breath no nausea vomiting no diaphoresis with it she is a diabetic she she has had a stress test in the past I discussed with admitting her however patient did not want to be admitted at this time because she normally goes to [facility] could transfer up there however she wants to just follow-up as an outpatient she is competent sober and stable to make this decision the follow-up as an outpatient I gave her an aspirin here she had no chest pain her pain never lasted more than a few seconds there was no radiation of pain no nausea vomiting no diaphoresis, beginning hours ago.  The complaint has been intermittent, mild in severity, and worsened by nothing.  Not related to exertion back when she exercises and she does not get any chest discomfort though I told her still could be early heart disease and she is a risk for heart attack and needs close follow-up    ROS:   Pertinent positives and negatives are stated within HPI, all other systems reviewed and are negative.  --------------------------------------------- PAST HISTORY ---------------------------------------------  Past Medical History:  has a past medical history of Leg pain, Seasonal allergies, Sleep apnea, and Vitamin D deficiency.    Past Surgical History:  has a past surgical history that includes sinus surgery; Hernia repair (Bilateral); Vaginal prolapse repair; Hysterectomy, vaginal; Cholecystectomy; and knee surgery (Right).    Social History:  reports that she quit smoking about 36 years ago. Her smoking use included cigarettes. She started smoking about 42 years ago. She has a 1.4 pack-year smoking history. She has never used smokeless tobacco. She reports current alcohol use. She reports that she does not use drugs.    Family

## 2025-06-24 ENCOUNTER — OFFICE VISIT (OUTPATIENT)
Dept: VASCULAR SURGERY | Age: 65
End: 2025-06-24
Payer: COMMERCIAL

## 2025-06-24 DIAGNOSIS — I87.2 VENOUS INSUFFICIENCY: Primary | ICD-10-CM

## 2025-06-24 PROCEDURE — 1036F TOBACCO NON-USER: CPT

## 2025-06-24 PROCEDURE — 3017F COLORECTAL CA SCREEN DOC REV: CPT

## 2025-06-24 PROCEDURE — G8427 DOCREV CUR MEDS BY ELIG CLIN: HCPCS

## 2025-06-24 PROCEDURE — 99213 OFFICE O/P EST LOW 20 MIN: CPT

## 2025-06-24 PROCEDURE — G8417 CALC BMI ABV UP PARAM F/U: HCPCS

## 2025-06-24 NOTE — PROGRESS NOTES
Vascular Surgery Outpatient Progress Note      Chief Complaint   Patient presents with    Surgical Consult     L. ankle swelling.       HISTORY OF PRESENT ILLNESS:                The patient is a 64 y.o. female who returns for follow-up evaluation of bilateral lower extremity spider veins.  Pt has know spider veins to both legs and was seen by Dr. Ruiz a few years ago for the same issue.  She was advised at that time to continue with compression stockings and seek sclerotherapy treatment.  She was again seen by our service last year with the same recommendations.      She presents today for left ankle swelling.  She states it came on out of nowhere.  She denies injury to the area.  The ankle swelling has since resolved.       Past Medical History:        Diagnosis Date    Leg pain     Seasonal allergies     Sleep apnea     Vitamin D deficiency      Past Surgical History:        Procedure Laterality Date    CHOLECYSTECTOMY      HERNIA REPAIR Bilateral     inguinal    HYSTERECTOMY, VAGINAL      KNEE SURGERY Right     SINUS SURGERY      VAGINAL PROLAPSE REPAIR      2003,2016     Current Medications:   Prior to Admission medications    Medication Sig Start Date End Date Taking? Authorizing Provider   mometasone (ASMANEX HFA) 100 MCG/ACT AERO inhaler Inhale 2 puffs into the lungs 2 times daily 3/24/25  Yes Willian Cash, DO   albuterol (PROVENTIL) (2.5 MG/3ML) 0.083% nebulizer solution Take 3 mLs by nebulization every 6 hours as needed for Wheezing or Shortness of Breath DX: Asthma J45.20, Covid Viral Infection U07.1 11/18/24  Yes Willian Cash, DO   albuterol sulfate HFA (PROVENTIL;VENTOLIN;PROAIR) 108 (90 Base) MCG/ACT inhaler Inhale 1 puff into the lungs every 4 hours as needed for Wheezing 10/8/24  Yes Willian Cahs, DO   metFORMIN (GLUCOPHAGE-XR) 500 MG extended release tablet Take 1 tablet by mouth 2 times daily 3/9/23  Yes Provider, MD Gilma   estradiol (ESTRACE) 0.1 MG/GM vaginal cream 0.1 g

## 2025-06-26 LAB
S PN DA SERO 19F IGG SER-MCNC: 0.5 UG/ML
S PNEUM DA 1 IGG SER-MCNC: 8.4 UG/ML
S PNEUM DA 10A IGG SER-MCNC: 1.1 UG/ML
S PNEUM DA 11A IGG SER-MCNC: 4 UG/ML
S PNEUM DA 12F IGG SER-MCNC: 1.3 UG/ML
S PNEUM DA 14 IGG SER-MCNC: 6.5
S PNEUM DA 15B IGG SER-MCNC: <0.3 UG/ML
S PNEUM DA 17F IGG SER-MCNC: 0.7 UG/ML
S PNEUM DA 18C IGG SER-MCNC: 1.5
S PNEUM DA 19A IGG SER-MCNC: 0.3 UG/ML
S PNEUM DA 2 IGG SER-MCNC: 0.5 UG/ML
S PNEUM DA 20A IGG SER-MCNC: 7.7 UG/ML
S PNEUM DA 22F IGG SER-MCNC: <0.3 UG/ML
S PNEUM DA 23F IGG SER-MCNC: 4.8 UG/ML
S PNEUM DA 3 IGG SER-MCNC: 0.6 UG/ML
S PNEUM DA 33F IGG SER-MCNC: <0.3 UG/ML
S PNEUM DA 4 IGG SER-MCNC: 0.7 UG/ML
S PNEUM DA 5 IGG SER-MCNC: 69.7 UG/ML
S PNEUM DA 6B IGG SER-MCNC: 4.7 UG/ML
S PNEUM DA 7F IGG SER-MCNC: 2.9 UG/ML
S PNEUM DA 8 IGG SER-MCNC: 0.5 UG/ML
S PNEUM DA 9N IGG SER-MCNC: <0.3 UG/ML
S PNEUM DA 9V IGG SER-MCNC: <0.3 UG/ML

## 2025-07-28 ENCOUNTER — OFFICE VISIT (OUTPATIENT)
Dept: PODIATRY | Age: 65
End: 2025-07-28
Payer: COMMERCIAL

## 2025-07-28 DIAGNOSIS — M21.612 BUNION, LEFT FOOT: Primary | ICD-10-CM

## 2025-07-28 DIAGNOSIS — L84 CORNS AND CALLUS: ICD-10-CM

## 2025-07-28 DIAGNOSIS — M79.672 PAIN IN LEFT FOOT: ICD-10-CM

## 2025-07-28 DIAGNOSIS — M79.671 PAIN OF RIGHT FOOT: ICD-10-CM

## 2025-07-28 DIAGNOSIS — M21.611 BUNION, RIGHT FOOT: ICD-10-CM

## 2025-07-28 DIAGNOSIS — R26.2 DIFFICULTY WALKING: ICD-10-CM

## 2025-07-28 PROCEDURE — 1036F TOBACCO NON-USER: CPT | Performed by: PODIATRIST

## 2025-07-28 PROCEDURE — G8428 CUR MEDS NOT DOCUMENT: HCPCS | Performed by: PODIATRIST

## 2025-07-28 PROCEDURE — 99204 OFFICE O/P NEW MOD 45 MIN: CPT | Performed by: PODIATRIST

## 2025-07-28 PROCEDURE — G8417 CALC BMI ABV UP PARAM F/U: HCPCS | Performed by: PODIATRIST

## 2025-07-28 PROCEDURE — 3017F COLORECTAL CA SCREEN DOC REV: CPT | Performed by: PODIATRIST

## 2025-07-28 RX ORDER — AMMONIUM LACTATE 12 G/100G
CREAM TOPICAL
Qty: 385 G | Refills: 4 | Status: SHIPPED | OUTPATIENT
Start: 2025-07-28

## 2025-07-28 NOTE — PROGRESS NOTES
EPINEPHrine (EPIPEN) 0.3 MG/0.3ML SOAJ injection, as needed, Disp: , Rfl:     cycloSPORINE (RESTASIS) 0.05 % ophthalmic emulsion, 1 drop 2 times daily, Disp: , Rfl:     lisinopril (PRINIVIL;ZESTRIL) 10 MG tablet, Take 1 tablet by mouth daily, Disp: , Rfl:     zinc 50 MG TABS tablet, Take 1 tablet by mouth daily, Disp: , Rfl:     pravastatin (PRAVACHOL) 10 MG tablet, Take 1 tablet by mouth daily, Disp: , Rfl: 0    vitamin D (ERGOCALCIFEROL) 23701 units CAPS capsule, Take 1 capsule by mouth once a week, Disp: , Rfl: 0    sodium chloride (OCEAN, BABY AYR) 0.65 % nasal spray, 1 spray by Nasal route as needed for Congestion, Disp: , Rfl:     dicyclomine (BENTYL) 10 MG capsule, Take 1 capsule by mouth 4 times daily (before meals and nightly) for 14 days, Disp: 56 capsule, Rfl: 0    prochlorperazine (COMPAZINE) 10 MG tablet, Take 1 tablet by mouth every 8 hours as needed (nausea), Disp: 21 tablet, Rfl: 0    Allergies:  Allergies   Allergen Reactions    Erythromycin Diarrhea and Other (See Comments)     stomach pain    Other reaction(s): Rash, Vomiting    Meperidine Hcl Other (See Comments)     demerol    Sulfa Antibiotics Other (See Comments)     stomach pain    Other reaction(s): Vomiting    Other reaction(s): Rash    Adhesive Tape Itching, Rash and Other (See Comments)     Surgical tape    REACTION TO SKIN GLUE (past surgery)    -RED/ITCHY TREATED WITH MEDROL PACK    Other reaction(s): Rash    Cyanoacrylate Rash     Other reaction(s): Itching/red   Skin glue    Dermabond Rash     Other reaction(s): Itching/red  Skin glue    Meperidine Nausea And Vomiting     Other reaction(s): Nausea/Vomiting       There were no vitals filed for this visit.     Past Medical History:   Diagnosis Date    Leg pain     Seasonal allergies     Sleep apnea     Vitamin D deficiency      Family History   Problem Relation Age of Onset    Diabetes Mother     Sleep Apnea Mother     High Blood Pressure Mother     COPD Father     High Blood